# Patient Record
Sex: FEMALE | HISPANIC OR LATINO | Employment: UNEMPLOYED | URBAN - METROPOLITAN AREA
[De-identification: names, ages, dates, MRNs, and addresses within clinical notes are randomized per-mention and may not be internally consistent; named-entity substitution may affect disease eponyms.]

---

## 2017-04-13 ENCOUNTER — OFFICE VISIT (OUTPATIENT)
Dept: URGENT CARE | Facility: PHYSICIAN GROUP | Age: 57
End: 2017-04-13
Payer: COMMERCIAL

## 2017-04-13 VITALS
RESPIRATION RATE: 18 BRPM | OXYGEN SATURATION: 99 % | HEART RATE: 80 BPM | DIASTOLIC BLOOD PRESSURE: 82 MMHG | SYSTOLIC BLOOD PRESSURE: 140 MMHG | HEIGHT: 62 IN | TEMPERATURE: 98.6 F

## 2017-04-13 DIAGNOSIS — R04.0 EPISTAXIS: ICD-10-CM

## 2017-04-13 PROCEDURE — 30901 CONTROL OF NOSEBLEED: CPT | Performed by: FAMILY MEDICINE

## 2017-04-13 RX ORDER — LISINOPRIL 5 MG/1
5 TABLET ORAL DAILY
COMMUNITY

## 2017-04-13 NOTE — MR AVS SNAPSHOT
"Gena Rosas   2017 5:00 PM   Office Visit   MRN: [id number]    Department:  [facility]   Dept Phone:  [phone]    Description:  Female : 1960   Provider:  Henrik Stringer M.D.           Reason for Visit     Epistaxis onset 1 hour ago      Allergies as of 2017     Allergen Noted Reactions    Nkda [No Known Drug Allergy] 2008         Vital Signs     Blood Pressure Pulse Temperature Respirations Height Oxygen Saturation    140/82 mmHg 80 37 °C (98.6 °F) 18 1.575 m (5' 2.01\") 99%    Smoking Status                   Current Some Day Smoker           Basic Information     Date Of Birth Sex Race Ethnicity Preferred Language    1960 Female  or   Origin (North Korean,Belarusian,Fijian,Cymraes, etc) English      Health Maintenance        Date Due Completion Dates    IMM HEP B VACCINE (1 of 3 - Primary Series) 1960 ---    IMM DTaP/Tdap/Td Vaccine (1 - Tdap) 1981 ---    PAP SMEAR 1983 ---    MAMMOGRAM 2002 ---    COLONOSCOPY 2012 ---            Current Immunizations     No immunizations on file.      Below and/or attached are the medications your provider expects you to take. Review all of your home medications and newly ordered medications with your provider and/or pharmacist. Follow medication instructions as directed by your provider and/or pharmacist. Please keep your medication list with you and share with your provider. Update the information when medications are discontinued, doses are changed, or new medications (including over-the-counter products) are added; and carry medication information at all times in the event of emergency situations     Allergies:  NKDA - (reactions not documented)               Medications  Valid as of: 2017 -  7:04 PM    Generic Name Brand Name Tablet Size Instructions for use    Levothyroxine Sodium (Tab) SYNTHROID 100 MCG Take 100 mcg by mouth Every morning on an empty " stomach.        Lisinopril (Tab) PRINIVIL 5 MG Take 5 mg by mouth every day.        MetFORMIN HCl (Tab) GLUCOPHAGE 1000 MG Take 1,000 mg by mouth 2 times a day, with meals.        Terbinafine HCl (Tab) LAMISIL 250 MG Take 250 mg by mouth every day.        .                 Medicines prescribed today were sent to:     [facility] [address] [facility], [place] - [address]    [address] [place] [address]    Phone: [phone] Fax: [phone]    Open 24 Hours?: No    [facility] [address] [place], [place] - [address] [facility] [address] [facility]    [address] [address]    Phone: [phone] Fax: [phone]    Open 24 Hours?: No      Medication refill instructions:       If your prescription bottle indicates you have medication refills left, it is not necessary to call your provider’s office. Please contact your pharmacy and they will refill your medication.    If your prescription bottle indicates you do not have any refills left, you may request refills at any time through one of the following ways: The online Songbird system (except Urgent Care), by calling your provider’s office, or by asking your pharmacy to contact your provider’s office with a refill request. Medication refills are processed only during regular business hours and may not be available until the next business day. Your provider may request additional information or to have a follow-up visit with you prior to refilling your medication.   *Please Note: Medication refills are assigned a new Rx number when refilled electronically. Your pharmacy may indicate that no refills were authorized even though a new prescription for the same medication is available at the pharmacy. Please request the medicine by name with the pharmacy before contacting your provider for a refill.           Songbird Access Code: [email or web address]  Expires: 5/13/2017  7:02 PM    Songbird  A secure, online tool to manage your health  information     [employer]’s Fanwards® is a secure, online tool that connects you to your personalized health information from the privacy of your home -- day or night - making it very easy for you to manage your healthcare. Once the activation process is completed, you can even access your medical information using the Fanwards vickie, which is available for free in the Apple Vickie store or Google Play store.     Fanwards provides the following levels of access (as shown below):   My Chart Features   [facility] Doctor [facility]  Specialists [facility]  Urgent  Care Non-[facility]  Primary Care  Doctor   Email your healthcare team securely and privately 24/7 X X X    Manage appointments: schedule your next appointment; view details of past/upcoming appointments X      Request prescription refills. X      View recent personal medical records, including lab and immunizations X X X X   View health record, including health history, allergies, medications X X X X   Read reports about your outpatient visits, procedures, consult and ER notes X X X X   See your discharge summary, which is a recap of your hospital and/or ER visit that includes your diagnosis, lab results, and care plan. X X       How to register for Fanwards:  1. Go to  [email or web address].  2. Click on the Sign Up Now box, which takes you to the New Member Sign Up page. You will need to provide the following information:  a. Enter your Fanwards Access Code exactly as it appears at the top of this page. (You will not need to use this code after you’ve completed the sign-up process. If you do not sign up before the expiration date, you must request a new code.)   b. Enter your date of birth.   c. Enter your home email address.   d. Click Submit, and follow the next screen’s instructions.  3. Create a Fanwards ID. This will be your Fanwards login ID and cannot be changed, so think of one that is secure and easy to remember.  4. Create a Fanwards password. You can  change your password at any time.  5. Enter your Password Reset Question and Answer. This can be used at a later time if you forget your password.   6. Enter your e-mail address. This allows you to receive e-mail notifications when new information is available in [employer].  7. Click Sign Up. You can now view your health information.    For assistance activating your Lumicity account, call [phone]        Quit Tobacco Information     Do you want to quit using tobacco?    Quitting tobacco decreases risks of cancer, heart and lung disease, increases life expectancy, improves sense of taste and smell, and increases spending money, among other benefits.    If you are thinking about quitting, we can help.  • [facility] Quit Tobacco Program: 333.538.6340  o Program occurs weekly for four weeks and includes pharmacist consultation on products to support quitting smoking or chewing tobacco. A provider referral is needed for pharmacist consultation.  • Tobacco Users Help Hotline: 5-800-QUIT-NOW (838-4577) or https://[place].quitlogix.org/  o Free, confidential telephone and online coaching for [place] residents. Sessions are designed on a schedule that is convenient for you. Eligible clients receive free nicotine replacement therapy.  • Nationally: www.smokefree.gov  o Information and professional assistance to support both immediate and long-term needs as you become, and remain, a non-smoker. Smokefree.gov allows you to choose the help that best fits your needs.

## 2017-04-19 ASSESSMENT — ENCOUNTER SYMPTOMS
SORE THROAT: 0
BRUISES/BLEEDS EASILY: 0

## 2017-04-19 NOTE — PROGRESS NOTES
"Subjective:      Gena Rosas is a 56 y.o. female who presents with Epistaxis            HPI  Onset 1hr PTA right epistaxis. No trauma. No PMH hemophilia or blood thinning medications. No pain.     Review of Systems   HENT: Positive for congestion. Negative for sore throat.    Endo/Heme/Allergies: Does not bruise/bleed easily.     .  Medications, Allergies, and current problem list reviewed today in Epic       Objective:     /82 mmHg  Pulse 80  Temp(Src) 37 °C (98.6 °F)  Resp 18  Ht 1.575 m (5' 2.01\")  SpO2 99%     Physical Exam   Constitutional: She appears well-developed and well-nourished. No distress.   HENT:   Bleeding from right anterior septum. Large clot blown out. Neosynephrine applied and nose clamp for 15 min with hemostasis achieved.    Eyes: Conjunctivae are normal.   Neurological:   Speech is clear. Patient is appropriate and cooperative.     Skin: Skin is warm and dry. No rash noted.               Assessment/Plan:     1. Epistaxis       Resolved  Differential diagnosis, natural history, supportive care, and indications for immediate follow-up discussed at length.     "

## 2017-07-05 ENCOUNTER — HOSPITAL ENCOUNTER (OUTPATIENT)
Dept: RADIOLOGY | Facility: MEDICAL CENTER | Age: 57
End: 2017-07-05
Attending: FAMILY MEDICINE
Payer: COMMERCIAL

## 2017-07-05 DIAGNOSIS — M25.531 RIGHT WRIST PAIN: ICD-10-CM

## 2017-07-05 PROCEDURE — 73110 X-RAY EXAM OF WRIST: CPT | Mod: RT

## 2018-07-25 ENCOUNTER — OFFICE VISIT (OUTPATIENT)
Dept: URGENT CARE | Facility: PHYSICIAN GROUP | Age: 58
End: 2018-07-25
Payer: COMMERCIAL

## 2018-07-25 VITALS
WEIGHT: 221 LBS | BODY MASS INDEX: 40.67 KG/M2 | DIASTOLIC BLOOD PRESSURE: 88 MMHG | RESPIRATION RATE: 16 BRPM | HEIGHT: 62 IN | SYSTOLIC BLOOD PRESSURE: 132 MMHG | OXYGEN SATURATION: 96 % | HEART RATE: 81 BPM | TEMPERATURE: 97.9 F

## 2018-07-25 DIAGNOSIS — M25.512 ACUTE PAIN OF LEFT SHOULDER: Primary | ICD-10-CM

## 2018-07-25 DIAGNOSIS — R07.89 CHEST WALL PAIN: ICD-10-CM

## 2018-07-25 PROCEDURE — 99214 OFFICE O/P EST MOD 30 MIN: CPT | Performed by: NURSE PRACTITIONER

## 2018-07-25 RX ORDER — NAPROXEN 500 MG/1
500 TABLET ORAL 2 TIMES DAILY WITH MEALS
Qty: 40 TAB | Refills: 0 | Status: SHIPPED | OUTPATIENT
Start: 2018-07-25 | End: 2021-08-29

## 2018-07-25 RX ORDER — KETOROLAC TROMETHAMINE 30 MG/ML
60 INJECTION, SOLUTION INTRAMUSCULAR; INTRAVENOUS ONCE
Status: COMPLETED | OUTPATIENT
Start: 2018-07-25 | End: 2018-07-25

## 2018-07-25 RX ADMIN — KETOROLAC TROMETHAMINE 60 MG: 30 INJECTION, SOLUTION INTRAMUSCULAR; INTRAVENOUS at 19:05

## 2018-07-26 NOTE — PROGRESS NOTES
"Subjective:      Gena Rosas is a 58 y.o. female who presents with Chest Pain (mild left-sided chest pain and upper back pain x5 days)    PFS reviewed and updated as necessary in EPIC electronic record with patient today.  Medications including OTC medications reviewed with patient.         Allergies   Allergen Reactions   • Nkda [No Known Drug Allergy]              HPI 58-year-old female here complaining of left upper chest pain, left shoulder pain and left upper back pain for 5 days. Pain is intermittent lasting about an hour to 2 hours at a time. Pain is worse with pressure to her chest or be event of her arm. She has recently lost a very close nephew and has been crying a lot and not sleeping as well secondary to grieving. When the chest pain occurs she is sometimes at rest and other times being active. There are no provoking activities or alleviating activities that she can identify. She has taken 400 mg of ibuprofen yesterday that did not alleviate the pain. She denies diaphoresis, nausea, vomiting. She denies trauma or unusual activities. Pain 4-5 out of 10, achy.    ROS    See history of present illness   Objective:     /88   Pulse 81   Temp 36.6 °C (97.9 °F)   Resp 16   Ht 1.575 m (5' 2\")   Wt 100.2 kg (221 lb)   SpO2 96%   BMI 40.42 kg/m²      Physical Exam   Constitutional: She is oriented to person, place, and time. She appears well-developed and well-nourished.   HENT:   Head: Normocephalic.   Eyes: Pupils are equal, round, and reactive to light.   Neck: Normal range of motion. Neck supple.   Cardiovascular: Normal rate, regular rhythm and normal heart sounds.    No murmur heard.  Pulmonary/Chest: Effort normal and breath sounds normal. No respiratory distress.         She exhibits tenderness. She exhibits no mass, no laceration, no crepitus, no edema, no deformity, no swelling and no retraction.       Musculoskeletal:        Left shoulder: She exhibits decreased range of " motion (limited range of motion with internal and external rotation bilaterally. Movement reproduces pain.), tenderness and pain. She exhibits no bony tenderness, no swelling, no effusion, no crepitus, no deformity, no spasm, normal pulse and normal strength.        Arms:  Neurological: She is alert and oriented to person, place, and time.   Skin: Skin is warm and dry.   Psychiatric: She has a normal mood and affect. Her behavior is normal. Judgment and thought content normal.   Nursing note and vitals reviewed.         EKG Interpretation    Interpreted by me    Rhythm: normal sinus   Rate: normal  Axis: normal  Ectopy: none  Conduction: normal  ST Segments: no acute change  T Waves: no acute change  Q Waves: none    Clinical Impression: no acute changes         Assessment/Plan:     1. Acute pain of left shoulder  ketorolac (TORADOL) injection 60 mg    naproxen (NAPROSYN) 500 MG Tab    EKG   2. Chest wall pain  ketorolac (TORADOL) injection 60 mg    naproxen (NAPROSYN) 500 MG Tab    EKG     Educated in proper administration of medication(s) ordered today including safety, possible SE, risks, benefits, rationale and alternatives to therapy.   Ice  Heat prn   Start naprosyn tomorrow . Take with food.   OTC acetaminophen for breakthrough pain. Dosage and directions per . Do not exceed 3000 mg in 24 hours.   Return to clinic or PCP  5-7 days if current symptoms are not resolving in a satisfactory manner or sooner if new or worsening symptoms occur.   Patient and family member  advised differential diagnoses, signs and symptoms which would warrant further evaluation and /or emergent evaluation.     Patient was in agreement with this treatment plan and seemed to understand without barriers.   Questions were encouraged and answered to patients satisfaction.

## 2020-01-26 ENCOUNTER — OFFICE VISIT (OUTPATIENT)
Dept: URGENT CARE | Facility: PHYSICIAN GROUP | Age: 60
End: 2020-01-26
Payer: COMMERCIAL

## 2020-01-26 VITALS
HEART RATE: 95 BPM | SYSTOLIC BLOOD PRESSURE: 132 MMHG | WEIGHT: 236.4 LBS | OXYGEN SATURATION: 96 % | TEMPERATURE: 99.1 F | HEIGHT: 61 IN | RESPIRATION RATE: 20 BRPM | BODY MASS INDEX: 44.63 KG/M2 | DIASTOLIC BLOOD PRESSURE: 70 MMHG

## 2020-01-26 DIAGNOSIS — K52.9 GASTROENTERITIS: ICD-10-CM

## 2020-01-26 PROCEDURE — 99214 OFFICE O/P EST MOD 30 MIN: CPT | Performed by: NURSE PRACTITIONER

## 2020-01-26 RX ORDER — ONDANSETRON 4 MG/1
4 TABLET, ORALLY DISINTEGRATING ORAL ONCE
OUTPATIENT
Start: 2020-01-26 | End: 2020-01-27

## 2020-01-26 RX ORDER — ONDANSETRON 4 MG/1
4 TABLET, ORALLY DISINTEGRATING ORAL EVERY 6 HOURS PRN
Qty: 10 TAB | Refills: 0 | Status: SHIPPED | OUTPATIENT
Start: 2020-01-26 | End: 2021-08-29

## 2020-01-26 ASSESSMENT — PAIN SCALES - GENERAL: PAINLEVEL: 6=MODERATE PAIN

## 2020-01-27 ASSESSMENT — ENCOUNTER SYMPTOMS
NAUSEA: 1
FEVER: 0
DIARRHEA: 1
VOMITING: 1
NUMBER OF EPISODES OF EMESIS TODAY: 1
CHILLS: 0

## 2020-01-27 NOTE — PROGRESS NOTES
Subjective:      Gena Rosas is a 59 y.o. female who presents with Emesis (diarhhea, body aches since 2am this morning )    Past Medical History:   Diagnosis Date   • Diabetes (HCC)    • Thyroid disease      Social History     Socioeconomic History   • Marital status: Single     Spouse name: Not on file   • Number of children: Not on file   • Years of education: Not on file   • Highest education level: Not on file   Occupational History   • Not on file   Social Needs   • Financial resource strain: Not on file   • Food insecurity:     Worry: Not on file     Inability: Not on file   • Transportation needs:     Medical: Not on file     Non-medical: Not on file   Tobacco Use   • Smoking status: Former Smoker     Packs/day: 0.00     Last attempt to quit: 2013     Years since quittin.5   • Smokeless tobacco: Never Used   Substance and Sexual Activity   • Alcohol use: No   • Drug use: No   • Sexual activity: Not on file   Lifestyle   • Physical activity:     Days per week: Not on file     Minutes per session: Not on file   • Stress: Not on file   Relationships   • Social connections:     Talks on phone: Not on file     Gets together: Not on file     Attends Yazidism service: Not on file     Active member of club or organization: Not on file     Attends meetings of clubs or organizations: Not on file     Relationship status: Not on file   • Intimate partner violence:     Fear of current or ex partner: Not on file     Emotionally abused: Not on file     Physically abused: Not on file     Forced sexual activity: Not on file   Other Topics Concern   • Not on file   Social History Narrative   • Not on file     History reviewed. No pertinent family history.    Allergies: Nkda [no known drug allergy]    Patient is a 59-year-old female who presents today with complaint of nausea, vomiting, and diarrhea with intermittent upper abdominal/epigastric pain.  Symptoms started over the last 18 hours.  She  "estimates 10 episodes of diarrhea and 4 episodes of vomiting.  Patient states that prior to onset of symptoms she ate some chicken wings that she thought might have been bad.  She states her symptoms started shortly after ingesting this food.  Patient is primarily Syrian-speaking and is accompanied by family member today who is translating at the patient's request.          Emesis   This is a new problem. The current episode started today. The problem has been unchanged. Associated symptoms include nausea and vomiting. Pertinent negatives include no chills or fever. Associated symptoms comments: Diarrhea. Nothing aggravates the symptoms. She has tried nothing for the symptoms. The treatment provided no relief.       Review of Systems   Constitutional: Positive for malaise/fatigue. Negative for chills and fever.   Gastrointestinal: Positive for diarrhea, nausea and vomiting.   All other systems reviewed and are negative.         Objective:     /70 (BP Location: Left arm, Patient Position: Sitting, BP Cuff Size: Adult)   Pulse 95   Temp 37.3 °C (99.1 °F) (Temporal)   Resp 20   Ht 1.549 m (5' 1\")   Wt 107.2 kg (236 lb 6.4 oz)   SpO2 96%   BMI 44.67 kg/m²      Physical Exam  Vitals signs reviewed.   Constitutional:       Appearance: Normal appearance.   HENT:      Head: Normocephalic.      Right Ear: Tympanic membrane, ear canal and external ear normal.      Left Ear: Tympanic membrane, ear canal and external ear normal.      Nose: Nose normal.      Mouth/Throat:      Mouth: Mucous membranes are moist.   Eyes:      Extraocular Movements: Extraocular movements intact.      Pupils: Pupils are equal, round, and reactive to light.   Neck:      Musculoskeletal: Normal range of motion and neck supple.   Cardiovascular:      Rate and Rhythm: Normal rate and regular rhythm.      Heart sounds: Normal heart sounds.   Pulmonary:      Effort: Pulmonary effort is normal.      Breath sounds: Normal breath sounds. "   Abdominal:      General: Abdomen is flat. There is no distension.      Tenderness: There is no tenderness. There is no guarding or rebound.      Comments: There is no abdominal pain on palpation at this time.   Musculoskeletal: Normal range of motion.   Skin:     General: Skin is warm and dry.   Neurological:      Mental Status: She is alert.                 Assessment/Plan:       1. Gastroenteritis    - ondansetron (ZOFRAN ODT) 4 MG TABLET DISPERSIBLE; Take 1 Tab by mouth every 6 hours as needed for Nausea.  Dispense: 10 Tab; Refill: 0  - ondansetron (ZOFRAN ODT) dispertab 4 mg  -Imodium over-the-counter as needed  -Push electrolyte fluids  -Gallia diet  -Strict ER precautions for intractable vomiting or diarrhea, fever, or developing abdominal pain  -Follow-up in urgent care otherwise for any further questions or concerns

## 2021-08-29 ENCOUNTER — OFFICE VISIT (OUTPATIENT)
Dept: URGENT CARE | Facility: PHYSICIAN GROUP | Age: 61
End: 2021-08-29
Payer: COMMERCIAL

## 2021-08-29 VITALS
SYSTOLIC BLOOD PRESSURE: 160 MMHG | OXYGEN SATURATION: 98 % | BODY MASS INDEX: 43 KG/M2 | RESPIRATION RATE: 40 BRPM | WEIGHT: 219 LBS | DIASTOLIC BLOOD PRESSURE: 94 MMHG | HEIGHT: 60 IN | HEART RATE: 59 BPM | TEMPERATURE: 96.2 F

## 2021-08-29 DIAGNOSIS — R11.2 NAUSEA AND VOMITING, INTRACTABILITY OF VOMITING NOT SPECIFIED, UNSPECIFIED VOMITING TYPE: ICD-10-CM

## 2021-08-29 DIAGNOSIS — K29.00 ACUTE GASTRITIS, PRESENCE OF BLEEDING UNSPECIFIED, UNSPECIFIED GASTRITIS TYPE: ICD-10-CM

## 2021-08-29 DIAGNOSIS — R10.9 ABDOMINAL PAIN, UNSPECIFIED ABDOMINAL LOCATION: ICD-10-CM

## 2021-08-29 LAB
APPEARANCE UR: NORMAL
BILIRUB UR STRIP-MCNC: NORMAL MG/DL
COLOR UR AUTO: NORMAL
GLUCOSE UR STRIP.AUTO-MCNC: NEGATIVE MG/DL
KETONES UR STRIP.AUTO-MCNC: 15 MG/DL
LEUKOCYTE ESTERASE UR QL STRIP.AUTO: NORMAL
NITRITE UR QL STRIP.AUTO: NEGATIVE
PH UR STRIP.AUTO: 5.5 [PH] (ref 5–8)
PROT UR QL STRIP: 30 MG/DL
RBC UR QL AUTO: NEGATIVE
SP GR UR STRIP.AUTO: >=1.03
UROBILINOGEN UR STRIP-MCNC: 0.2 MG/DL

## 2021-08-29 PROCEDURE — 81002 URINALYSIS NONAUTO W/O SCOPE: CPT | Performed by: NURSE PRACTITIONER

## 2021-08-29 PROCEDURE — 99214 OFFICE O/P EST MOD 30 MIN: CPT | Performed by: NURSE PRACTITIONER

## 2021-08-29 RX ORDER — ONDANSETRON 4 MG/1
4 TABLET, ORALLY DISINTEGRATING ORAL EVERY 6 HOURS PRN
Qty: 10 TABLET | Refills: 0 | Status: SHIPPED | OUTPATIENT
Start: 2021-08-29

## 2021-08-29 RX ORDER — ONDANSETRON 2 MG/ML
4 INJECTION INTRAMUSCULAR; INTRAVENOUS ONCE
Status: COMPLETED | OUTPATIENT
Start: 2021-08-29 | End: 2021-08-29

## 2021-08-29 RX ORDER — OMEPRAZOLE 40 MG/1
40 CAPSULE, DELAYED RELEASE ORAL DAILY
Qty: 14 CAPSULE | Refills: 0 | Status: SHIPPED | OUTPATIENT
Start: 2021-08-29

## 2021-08-29 RX ORDER — SUCRALFATE 1 G/1
1 TABLET ORAL
Qty: 56 TABLET | Refills: 0 | Status: SHIPPED | OUTPATIENT
Start: 2021-08-29

## 2021-08-29 RX ORDER — ATORVASTATIN CALCIUM 20 MG/1
20 TABLET, FILM COATED ORAL NIGHTLY
COMMUNITY

## 2021-08-29 RX ADMIN — ONDANSETRON 4 MG: 2 INJECTION INTRAMUSCULAR; INTRAVENOUS at 10:45

## 2021-08-29 RX ADMIN — ONDANSETRON 4 MG: 2 INJECTION INTRAMUSCULAR; INTRAVENOUS at 10:18

## 2021-08-29 ASSESSMENT — ENCOUNTER SYMPTOMS
ABDOMINAL PAIN: 1
CARDIOVASCULAR NEGATIVE: 1
NAUSEA: 1
RESPIRATORY NEGATIVE: 1
CHILLS: 1
DIARRHEA: 0
VOMITING: 1

## 2021-08-29 ASSESSMENT — VISUAL ACUITY: OU: 1

## 2021-08-29 NOTE — LETTER
August 29, 2021         Patient: Gena Rosas   YOB: 1960   Date of Visit: 8/29/2021           To Whom it May Concern:    Gena Joseph was seen in my clinic on 8/29/2021 due to illness. Due to medical necessity, please excuse patient from work for up to the next 3 days as needed.     If you have any questions or concerns, please don't hesitate to call.        Sincerely,         DYLLAN Carranza.  Electronically Signed

## 2021-08-29 NOTE — PATIENT INSTRUCTIONS
Gastritis - Adultos  Gastritis, Adult  La gastritis es la inflamación del estómago. Hay dos tipos de gastritis:  · Gastritis aguda. Mami tipo aparece de manera repentina.  · Gastritis crónica. Mami tipo es mucho más frecuente y dura mucho tiempo.  La gastritis se manifiesta cuando el revestimiento del estómago se debilita o se lesiona. Sin tratamiento, la gastritis puede causar sangrado y úlceras estomacales.  ¿Cuáles son las causas?  Esta afección puede ser causada por lo siguiente:  · Infección.  · Beber alcohol en exceso.  · Ciertos medicamentos. Estos incluyen corticoesteroides, antibióticos y algunos medicamentos de venta sin receta, laurie aspirina o ibuprofeno.  · Hay demasiado ácido en el estómago.  · Stephie enfermedad del intestino o del estómago.  · Estrés.  · Stephie reacción alérgica.  · Enfermedad de Crohn.  · Algunos tratamientos para el cáncer (radiación).  A veces, se desconoce la causa de esta afección.  ¿Cuáles son los signos o los síntomas?  Los síntomas de esta afección incluyen los siguientes:  · Dolor o sensación de ardor en la parte superior del abdomen.  · Náuseas.  · Vómitos.  · Sensación molesta de saciedad después de comer.  · Pérdida de peso.  · Mal aliento.  · Marya en el vómito o las heces.  En algunos casos, no hay síntomas.  ¿Cómo se diagnostica?  Esta afección puede diagnosticarse en función de lo siguiente:  · Marya antecedentes médicos y stephie descripción de marya síntomas.  · Un examen físico.  · Estudios. Estos pueden incluir los siguientes:  ? Análisis de marya.  ? Pruebas de heces.  ? Un estudio en el que se introduce un instrumento brizuela y flexible con stephie tree y stephie pequeña cámara a través del esófago hasta el estómago (endoscopía nilson).  ? Un estudio en el cual se joanna stephie muestra de tejido para analizarlo (biopsia).  ¿Cómo se trata?  Esta afección se puede tratar con medicamentos. Los medicamentos que se utilizan varían según la causa de la gastritis:  · Si la afección se debe a stephie  infección bacteriana, pueden recetarle medicamentos antibióticos.  · Si la afección se debe al exceso de ácido estomacal, se pueden administrar medicamentos denominados bloqueadores H2, inhibidores de la bomba de protones o antiácidos.  El tratamiento puede también incluir interrumpir el uso de ciertos medicamentos laurie aspirina, ibuprofeno u otros antiinflamatorios no esteroideos (FRANTZ).  Siga estas indicaciones en melo casa:  Medicamentos  · [place] los medicamentos de venta namita y los recetados solamente laurie se lo haya indicado el médico.  · Si le recetaron un antibiótico, tómelo laurie se lo haya indicado el médico. No deje de alix el antibiótico, aunque comience a sentirse mejor.  Comida y bebida    · [place]ody comidas pequeñas y frecuentes sarmad el día en lugar de comidas abundantes.  · Evite los alimentos y las bebidas que intensifican los síntomas.  · [place] suficiente líquido laurie para mantener la orina de color amarillo pálido.  Consumo de alcohol  · No lambert alcohol si:  ? Melo médico le indica no hacerlo.  ? Está embarazada, puede estar embarazada o está tratando de quedar embarazada.  · Si robe alcohol:  ? Limite melo uso a las siguientes medidas:  § De 0 a 1 medida por día para las mujeres.  § De 0 a 2 medidas por día para los hombres.  ? Esté atento a la cantidad de alcohol que hay en las bebidas que joanna. En los [place], herber medida equivale a herber botella de cerveza de 12 oz (355 ml), un vaso de vino de 5 oz (148 ml) o un vaso de herber bebida alcohólica de nilson graduación de 1½ oz (44 ml).  Indicaciones generales  · Hable con el médico sobre las formas de controlar el estrés, laurie realizar ejercicio con regularidad o practicar respiración profunda, meditación o yoga.  · No consuma ningún producto que contenga nicotina o tabaco, laurie cigarrillos y cigarrillos electrónicos. Si necesita ayuda para dejar de fumar, consulte al médico.  · Concurra a todas las visitas de seguimiento laurie se lo haya indicado el  médico. [place] es importante.  Comuníquese con un médico si:  · Teagan síntomas empeoran.  · Los síntomas regresan después del tratamiento.  Solicite ayuda inmediatamente si:  · Vomita marya de color cesar brillante o herber sustancia similar a los granos de café.  · Las heces son negras o de color cesar oscuro.  · No puede retener los líquidos.  · El dolor abdominal empeora.  · Tiene fiebre.  · No se siente mejor luego de herber semana.  Resumen  · La gastritis es la inflamación del revestimiento del estómago que puede ocurrir de manera repentina (aguda) o desarrollarse lentamente con el tiempo (crónica).  · Esta afección se diagnostica mediante los antecedentes médicos, un examen físico o estudios.  · Esta afección puede tratarse con medicamentos para tratar la infección o medicamentos para reducir la cantidad de ácido en el estómago.  · Siga las indicaciones del médico acerca de alix medicamentos, hacer cambios en la dieta y saber cuándo pedir ayuda.  Esta información no tiene laurie fin reemplazar el consejo del médico. Asegúrese de hacerle al médico cualquier pregunta que tenga.  Document Released: 09/27/2006 Document Revised: 06/26/2019 Document Reviewed: 06/26/2019  Elsevier Patient Education © 2020 Elsevier Inc.        Náuseas y vómitos en los adultos  Nausea and Vomiting, Adult  Las náuseas son herber sensación de malestar en el estómago o de que se está por vomitar. Los vómitos se producen cuando el contenido del estómago se expulsa por la boca laurie consecuencia de las náuseas. Pueden hacerlo sentir débil y causar deshidratación.  La deshidratación puede hacerle sentir cansancio, sed, sequedad en la boca y disminución en la frecuencia con la que orina. Los adultos mayores y las personas que tienen otras enfermedades o un sistema de defensa (sistema inmunitario) débil tienen mayor riesgo de sufrir deshidratación. Es importante tratar los vómitos laurie se lo haya indicado el médico.  Siga estas indicaciones en melo  casa:  Controle teagan síntomas para detectar cualquier cambio. Informe al médico acerca de los cambios. Siga estas indicaciones para cuidarse en melo hogar.  Comida y bebida         · [place] herber solución de rehidratación oral (SRO). Esta es herber bebida que se vende en farmacias y tiendas minoristas.  · En la medida en que pueda, lambert líquidos ruma lentamente y en pequeñas cantidades. Los líquidos ruma incluyen agua, cubitos de hielo, bebidas deportivas bajas en calorías y jugo de fruta rebajado con agua (jugo de fruta diluido).  · En la medida en que pueda, consuma alimentos blandos y fáciles de digerir en pequeñas cantidades. Estos alimentos incluyen bananas, compota de manzana, arroz, roseanna magras, tostadas y galletas saladas.  · Evite consumir líquidos que contengan mucha azúcar o cafeína, laurie bebidas energéticas, bebidas deportivas y refrescos.  · Evite alix alcohol.  · Evite los alimentos condimentados o con alto contenido de grasa.  Indicaciones generales  · [place] los medicamentos de venta namita y los recetados solamente laurie se lo haya indicado el médico.  · [place] suficiente líquido laurie para mantener la orina de color amarillo pálido.  · Lávese las anna marie frecuentemente usando agua y jabón. Use desinfectante para anna marie si no dispone de agua y jabón.  · Asegúrese de que todas las personas que viven en melo casa se laven molina las anna marie y con frecuencia.  · Descanse en melo casa mientras se recupera.  · Controle melo afección para detectar cualquier cambio.  · Cuando sienta náuseas, respire lenta y profundamente.  · Concurra a todas las visitas de control laurie se lo haya indicado el médico. [place] es importante.  Comuníquese con un médico si:  · Teagan síntomas empeoran.  · Aparecen nuevos síntomas.  · Tiene fiebre.  · No puede beber líquidos sin vomitar.  · Las náuseas no desaparecen después de 2 días.  · Se siente aturdido o mareado.  · Tiene dolor de darcy.  · Tiene calambres musculares.  · Tiene herber erupción  cutánea.  · Siente dolor al orinar.  Solicite ayuda inmediatamente si:  · Siente dolor en el pecho, el ambar, los brazos o la mandíbula.  · Se siente muy débil o se desmaya.  · Tiene vómitos persistentes.  · Vomita y el vómito es de color cesar intenso o se asemeja al poso del café.  · Tiene heces con marya, de color darin, o con aspecto alquitranado.  · Siente dolor de darcy intenso, rigidez en el ambar, o ambas cosas.  · Tiene dolor intenso, cólicos o distensión abdominal.  · Tiene dificultades para respirar, o respira muy rápido.  · Melo corazón late muy rápidamente.  · Siente la piel fría y húmeda.  · Se siente confundido.  · Tiene signos de deshidratación, laurie los siguientes:  ? Orina de color oscuro, muy escasa o falta de orina.  ? Labios agrietados.  ? Sequedad de boca.  ? Ojos hundidos.  ? Somnolencia.  ? Debilidad.  Estos síntomas pueden representar un problema grave que constituye herber emergencia. No espere a cara si los síntomas desaparecen. Solicite atención médica de inmediato. Comuníquese con el servicio de emergencias de melo localidad (911 en los [place]). No conduzca por marya propios medios hasta el hospital.  Resumen  · Las náuseas son herber sensación de malestar en el estómago o de que se está por vomitar. Si las náuseas empeoran, pueden provocar vómitos. Pueden hacerlo sentir débil y causar deshidratación.  · Siga las indicaciones del médico respecto de las comidas y las bebidas para prevenir la deshidratación.  · [place] los medicamentos de venta namita y los recetados solamente laurie se lo haya indicado el médico.  · Comuníquese con melo médico si los síntomas empeoran o si tiene nuevos síntomas.  · Concurra a todas las visitas de control laurie se lo haya indicado el médico. [place] es importante.  Esta información no tiene laurie fin reemplazar el consejo del médico. Asegúrese de hacerle al médico cualquier pregunta que tenga.  Document Released: 01/06/2009 Document Revised: 08/05/2019 Document Reviewed:  08/05/2019  Elsevier Patient Education © 2020 Elsevier Inc.

## 2021-08-29 NOTE — PROGRESS NOTES
Subjective:     Gena Rosas is a 61 y.o. female who presents for Abdominal Pain (Pt reports mid abd pain. Onset 3 hours. Pt reports severe pain, nausea, emesis x5. Pt sts she has a hx of gastroenteritis. Pt sts she had shrimp luis at a frineds house last night. pt st sshe ahs been feeling like this for the last 3 weeks but it comes and goes until this morning. )       Abdominal Pain  This is a new problem. The problem has been gradually worsening. Associated symptoms include nausea and vomiting. Pertinent negatives include no diarrhea, dysuria or frequency.     Patient brought in by family member who is helping to translate in [place].    Patient reporting epigastric abdominal pain, nausea, vomiting, and chills which started yesterday after eating shrimp Luis had a friend's house.  History of gastroenteritis in the past.  Denies urinary symptoms, shortness of breath, chest pain, diarrhea, or other symptoms.    Patient was screened prior to rooming and denied COVID-19 diagnosis or contact with a person who has been diagnosed or is suspected to have COVID-19. During this visit, appropriate PPE was worn, hand hygiene was performed, and the patient and any visitors were masked.     PMH:  has a past medical history of Diabetes (HCC) and Thyroid disease. She also has no past medical history of ASTHMA, CAD (coronary artery disease), Cancer (HCC), Congestive heart failure (HCC), COPD, Hypertension, Infectious disease, Liver disease, Psychiatric disorder, Renal disorder, Seizure disorder (HCC), or Stroke (HCC).    MEDS:   Current Outpatient Medications:   •  atorvastatin (LIPITOR) 20 MG Tab, Take 20 mg by mouth every evening., Disp: , Rfl:   •  ondansetron (ZOFRAN ODT) 4 MG TABLET DISPERSIBLE, Take 1 Tablet by mouth every 6 hours as needed for Nausea. Dissolve in mouth., Disp: 10 Tablet, Rfl: 0  •  omeprazole (PRILOSEC) 40 MG delayed-release capsule, Take 1 Capsule by mouth every day., Disp: 14  Capsule, Rfl: 0  •  sucralfate (CARAFATE) 1 GM Tab, Take 1 Tablet by mouth 4 Times a Day,Before Meals and at Bedtime., Disp: 56 Tablet, Rfl: 0  •  lisinopril (PRINIVIL) 5 MG Tab, Take 5 mg by mouth every day., Disp: , Rfl:   •  metformin (GLUCOPHAGE) 1000 MG tablet, Take 1,000 mg by mouth 2 times a day, with meals., Disp: , Rfl:   •  levothyroxine (SYNTHROID) 100 MCG TABS, Take 100 mcg by mouth Every morning on an empty stomach., Disp: , Rfl:   •  ondansetron (ZOFRAN ODT) 4 MG TABLET DISPERSIBLE, Take 1 Tab by mouth every 6 hours as needed for Nausea. (Patient not taking: Reported on 8/29/2021), Disp: 10 Tab, Rfl: 0  •  naproxen (NAPROSYN) 500 MG Tab, Take 1 Tab by mouth 2 times a day, with meals. (Patient not taking: Reported on 8/29/2021), Disp: 40 Tab, Rfl: 0    ALLERGIES:   Allergies   Allergen Reactions   • Nkda [No Known Drug Allergy]      SURGHX: History reviewed. No pertinent surgical history.    SOCHX:  reports that she quit smoking about 8 years ago. She smoked 0.00 packs per day. She has never used smokeless tobacco. She reports that she does not drink alcohol and does not use drugs.     FH: Reviewed with patient, not pertinent to this visit.    Review of Systems   Constitutional: Positive for chills.   Respiratory: Negative.    Cardiovascular: Negative.    Gastrointestinal: Positive for abdominal pain, nausea and vomiting. Negative for diarrhea.   Genitourinary: Negative.  Negative for dysuria, frequency and urgency.   All other systems reviewed and are negative.    Additional details per HPI.      Objective:     /94 (BP Location: Left arm, Patient Position: Sitting, BP Cuff Size: Large adult)   Pulse (!) 59   Temp (!) 35.7 °C (96.2 °F) (Temporal)   Resp (!) 40   Ht 1.524 m (5')   Wt 99.3 kg (219 lb)   SpO2 98%   BMI 42.77 kg/m²     Physical Exam  Vitals reviewed.   Constitutional:       General: She is not in acute distress.     Appearance: She is well-developed. She is ill-appearing  (Vomiting, hand placed on epigastric region). She is not toxic-appearing.   HENT:      Head: Normocephalic.      Right Ear: External ear normal.      Left Ear: External ear normal.   Eyes:      General: Vision grossly intact.      Extraocular Movements: Extraocular movements intact.      Conjunctiva/sclera: Conjunctivae normal.   Cardiovascular:      Rate and Rhythm: Normal rate.   Pulmonary:      Effort: Pulmonary effort is normal. No respiratory distress.   Abdominal:      General: Bowel sounds are increased. There is no distension.      Palpations: Abdomen is soft.      Tenderness: There is no abdominal tenderness.   Musculoskeletal:         General: No deformity. Normal range of motion.      Cervical back: Normal range of motion.   Skin:     General: Skin is warm and dry.      Coloration: Skin is not pale.   Neurological:      Mental Status: She is alert and oriented to person, place, and time.      Sensory: No sensory deficit.      Motor: No weakness.      Coordination: Coordination normal.   Psychiatric:         Behavior: Behavior normal. Behavior is cooperative.     UA: trace LE, protein, ketone; small bili, otherwise unremarkable      Assessment/Plan:     1. Abdominal pain, unspecified abdominal location  - POCT Urinalysis  - omeprazole (PRILOSEC) 40 MG delayed-release capsule; Take 1 Capsule by mouth every day.  Dispense: 14 Capsule; Refill: 0  - sucralfate (CARAFATE) 1 GM Tab; Take 1 Tablet by mouth 4 Times a Day,Before Meals and at Bedtime.  Dispense: 56 Tablet; Refill: 0    2. Acute gastritis, presence of bleeding unspecified, unspecified gastritis type  - omeprazole (PRILOSEC) 40 MG delayed-release capsule; Take 1 Capsule by mouth every day.  Dispense: 14 Capsule; Refill: 0  - sucralfate (CARAFATE) 1 GM Tab; Take 1 Tablet by mouth 4 Times a Day,Before Meals and at Bedtime.  Dispense: 56 Tablet; Refill: 0    3. Nausea and vomiting, intractability of vomiting not specified, unspecified vomiting type  -  ondansetron (ZOFRAN) syringe/vial injection 4 mg  - ondansetron (ZOFRAN ODT) 4 MG TABLET DISPERSIBLE; Take 1 Tablet by mouth every 6 hours as needed for Nausea. Dissolve in mouth.  Dispense: 10 Tablet; Refill: 0  - ondansetron (ZOFRAN) syringe/vial injection 4 mg    Zofran injection given in clinic. Patient reporting improvement in nausea/vomiting. Had another episode of vomiting. 2nd shot given.    GI cocktail given. Patient reporting improvement in epigastric abdominal pain.    Discussed likely self-limiting viral etiology and expected course and duration of illness. Vital signs stable, afebrile, no acute distress at this time.    Rx as above sent electronically.     Vital signs stable, afebrile, no acute distress at this time. Warning signs reviewed. Strict return/ER precautions advised.     Differential diagnosis, natural history, supportive care, over-the-counter symptom management per 's instructions, close monitoring, and indications for immediate follow-up discussed.     All questions answered. Patient agrees with the plan of care.    Discharge summary provided.    Billing note: 30 minutes was allotted and spent for patient care and coordination of care (not reported separately) including preparing for the visit, obtaining/reviewing history with the assistance of family member, performing an exam/evaluation, ordering labs/tests/procedures, developing a plan of care, counseling/educating the patient, and documentation. Care specific to this encounter was summarized here. Please refer to the chart for additional details on the care provided.

## 2022-12-21 ENCOUNTER — OFFICE VISIT (OUTPATIENT)
Dept: URGENT CARE | Facility: PHYSICIAN GROUP | Age: 62
End: 2022-12-21
Payer: COMMERCIAL

## 2022-12-21 ENCOUNTER — HOSPITAL ENCOUNTER (OUTPATIENT)
Facility: MEDICAL CENTER | Age: 62
End: 2022-12-21
Attending: FAMILY MEDICINE
Payer: COMMERCIAL

## 2022-12-21 VITALS
HEIGHT: 60 IN | SYSTOLIC BLOOD PRESSURE: 128 MMHG | HEART RATE: 76 BPM | TEMPERATURE: 99.9 F | RESPIRATION RATE: 18 BRPM | OXYGEN SATURATION: 97 % | WEIGHT: 218 LBS | BODY MASS INDEX: 42.8 KG/M2 | DIASTOLIC BLOOD PRESSURE: 82 MMHG

## 2022-12-21 DIAGNOSIS — Z03.818 ENCOUNTER FOR PATIENT CONCERN ABOUT EXPOSURE TO INFECTIOUS ORGANISM: ICD-10-CM

## 2022-12-21 LAB
EXTERNAL QUALITY CONTROL: NORMAL
FLUAV+FLUBV AG SPEC QL IA: NEGATIVE
INT CON NEG: NEGATIVE
INT CON NEG: NEGATIVE
INT CON POS: POSITIVE
INT CON POS: POSITIVE
SARS-COV+SARS-COV-2 AG RESP QL IA.RAPID: NEGATIVE

## 2022-12-21 PROCEDURE — 99213 OFFICE O/P EST LOW 20 MIN: CPT | Performed by: FAMILY MEDICINE

## 2022-12-21 PROCEDURE — 87426 SARSCOV CORONAVIRUS AG IA: CPT | Performed by: FAMILY MEDICINE

## 2022-12-21 PROCEDURE — U0003 INFECTIOUS AGENT DETECTION BY NUCLEIC ACID (DNA OR RNA); SEVERE ACUTE RESPIRATORY SYNDROME CORONAVIRUS 2 (SARS-COV-2) (CORONAVIRUS DISEASE [COVID-19]), AMPLIFIED PROBE TECHNIQUE, MAKING USE OF HIGH THROUGHPUT TECHNOLOGIES AS DESCRIBED BY CMS-2020-01-R: HCPCS

## 2022-12-21 PROCEDURE — 87804 INFLUENZA ASSAY W/OPTIC: CPT | Performed by: FAMILY MEDICINE

## 2022-12-21 PROCEDURE — U0005 INFEC AGEN DETEC AMPLI PROBE: HCPCS

## 2022-12-21 NOTE — LETTER
December 21, 2022    To Whom It May Concern:         This is confirmation that Gena Rosas attended her scheduled appointment with Melissa Sandhu M.D. on 12/21/22.  COVID testing is in progress, please allow up to 72 hours for results.  If negative, patient can return to work when she has had no fevers for 24 hours without the help of medication.         If you have any questions please do not hesitate to call me at the phone number listed below.    Sincerely,          Melissa Sandhu M.D.  [phone]

## 2022-12-22 DIAGNOSIS — Z03.818 ENCOUNTER FOR PATIENT CONCERN ABOUT EXPOSURE TO INFECTIOUS ORGANISM: ICD-10-CM

## 2022-12-22 LAB
SARS-COV-2 RNA RESP QL NAA+PROBE: NOTDETECTED
SPECIMEN SOURCE: NORMAL

## 2022-12-22 NOTE — PROGRESS NOTES
Subjective:      62 y.o. female presents to urgent care for cold symptoms that started yesterday. She is experiencing sore throat, body aches, headache, ear pain, cough. No fever or diarrhea. She hasn't been using any medication for her symptoms. She denies any tobacco product use.  No history of asthma or COPD.  She is not vaccinated against COVID.  No known sick contacts.    She denies any other questions or concerns at this time.    Current problem list, medication, and past medical/surgical history were reviewed in Epic.    ROS  See HPI     Objective:      /82 (BP Location: Right arm, Patient Position: Sitting, BP Cuff Size: Adult)   Pulse 76   Temp 37.7 °C (99.9 °F) (Temporal)   Resp 18   Ht 1.524 m (5')   Wt 98.9 kg (218 lb)   SpO2 97%   BMI 42.58 kg/m²     Physical Exam  Constitutional:       General: She is not in acute distress.     Appearance: She is not diaphoretic.   HENT:      Right Ear: Tympanic membrane, ear canal and external ear normal.      Left Ear: Tympanic membrane, ear canal and external ear normal.      Mouth/Throat:      Tongue: Tongue does not deviate from midline.      Palate: No lesions.      Pharynx: Uvula midline. No posterior oropharyngeal erythema.      Tonsils: No tonsillar exudate. 1+ on the right. 1+ on the left.   Cardiovascular:      Rate and Rhythm: Normal rate and regular rhythm.      Heart sounds: Normal heart sounds.   Pulmonary:      Effort: Pulmonary effort is normal. No respiratory distress.      Breath sounds: Normal breath sounds.   Neurological:      Mental Status: She is alert.   Psychiatric:         Mood and Affect: Affect normal.         Judgment: Judgment normal.     Assessment/Plan:     1. Encounter for patient concern about exposure to infectious organism  Negative rapid flu and COVID.  PCR COVID has been sent. In the meantime patient was advised to isolate until COVID test results returned. I encouraged mask use, frequent handwashing, wiping down  hard surfaces, etc. Tylenol and Ibuprofen were recommended for symptomatic relief. If positive they will be contacted by their local health department regarding return to work/school protocols. Patient is currently without indication of need for higher level of care. Patient/Guardian was given precautionary signs/symptoms that mandate immediate follow up and evaluation in the ED. The patient and/or guardian demonstrated a good understanding and agreed with the treatment plan.  - POCT Influenza A/B  - POCT SARS-COV Antigen AYDEE Manual Result  - SARS-CoV-2 PCR (24 hour In-House): Collect NP swab in VTM; Future      Instructed to return to Urgent Care or nearest Emergency Department if symptoms fail to improve, for any change in condition, further concerns, or new concerning symptoms. Patient states understanding of the plan of care and discharge instructions.    Melissa Sandhu M.D.

## 2024-09-03 PROCEDURE — 36415 COLL VENOUS BLD VENIPUNCTURE: CPT

## 2024-09-03 PROCEDURE — 99285 EMERGENCY DEPT VISIT HI MDM: CPT

## 2024-09-03 PROCEDURE — 93005 ELECTROCARDIOGRAM TRACING: CPT

## 2024-09-03 PROCEDURE — 96375 TX/PRO/DX INJ NEW DRUG ADDON: CPT

## 2024-09-03 PROCEDURE — 93005 ELECTROCARDIOGRAM TRACING: CPT | Performed by: STUDENT IN AN ORGANIZED HEALTH CARE EDUCATION/TRAINING PROGRAM

## 2024-09-03 PROCEDURE — 96374 THER/PROPH/DIAG INJ IV PUSH: CPT

## 2024-09-04 ENCOUNTER — APPOINTMENT (OUTPATIENT)
Dept: RADIOLOGY | Facility: MEDICAL CENTER | Age: 64
End: 2024-09-04
Attending: STUDENT IN AN ORGANIZED HEALTH CARE EDUCATION/TRAINING PROGRAM
Payer: COMMERCIAL

## 2024-09-04 ENCOUNTER — HOSPITAL ENCOUNTER (EMERGENCY)
Facility: MEDICAL CENTER | Age: 64
End: 2024-09-04
Attending: STUDENT IN AN ORGANIZED HEALTH CARE EDUCATION/TRAINING PROGRAM
Payer: COMMERCIAL

## 2024-09-04 VITALS
SYSTOLIC BLOOD PRESSURE: 143 MMHG | HEIGHT: 65 IN | RESPIRATION RATE: 17 BRPM | WEIGHT: 200 LBS | DIASTOLIC BLOOD PRESSURE: 64 MMHG | HEART RATE: 75 BPM | OXYGEN SATURATION: 95 % | TEMPERATURE: 97.4 F | BODY MASS INDEX: 33.32 KG/M2

## 2024-09-04 DIAGNOSIS — R79.89 ELEVATED LFTS: ICD-10-CM

## 2024-09-04 DIAGNOSIS — K29.00 ACUTE GASTRITIS WITHOUT HEMORRHAGE, UNSPECIFIED GASTRITIS TYPE: ICD-10-CM

## 2024-09-04 LAB
ALBUMIN SERPL BCP-MCNC: 3.8 G/DL (ref 3.2–4.9)
ALBUMIN/GLOB SERPL: 1.1 G/DL
ALP SERPL-CCNC: 83 U/L (ref 30–99)
ALT SERPL-CCNC: 107 U/L (ref 2–50)
ANION GAP SERPL CALC-SCNC: 14 MMOL/L (ref 7–16)
AST SERPL-CCNC: 218 U/L (ref 12–45)
BASOPHILS # BLD AUTO: 0.3 % (ref 0–1.8)
BASOPHILS # BLD: 0.03 K/UL (ref 0–0.12)
BILIRUB SERPL-MCNC: 0.9 MG/DL (ref 0.1–1.5)
BUN SERPL-MCNC: 15 MG/DL (ref 8–22)
CALCIUM ALBUM COR SERPL-MCNC: 9.4 MG/DL (ref 8.5–10.5)
CALCIUM SERPL-MCNC: 9.2 MG/DL (ref 8.5–10.5)
CHLORIDE SERPL-SCNC: 102 MMOL/L (ref 96–112)
CO2 SERPL-SCNC: 21 MMOL/L (ref 20–33)
CREAT SERPL-MCNC: 0.62 MG/DL (ref 0.5–1.4)
EKG IMPRESSION: NORMAL
EOSINOPHIL # BLD AUTO: 0.01 K/UL (ref 0–0.51)
EOSINOPHIL NFR BLD: 0.1 % (ref 0–6.9)
ERYTHROCYTE [DISTWIDTH] IN BLOOD BY AUTOMATED COUNT: 44.6 FL (ref 35.9–50)
GFR SERPLBLD CREATININE-BSD FMLA CKD-EPI: 100 ML/MIN/1.73 M 2
GLOBULIN SER CALC-MCNC: 3.5 G/DL (ref 1.9–3.5)
GLUCOSE SERPL-MCNC: 172 MG/DL (ref 65–99)
HAV IGM SERPL QL IA: NORMAL
HBV CORE IGM SER QL: NORMAL
HBV SURFACE AG SER QL: NORMAL
HCT VFR BLD AUTO: 40.1 % (ref 37–47)
HCV AB SER QL: NORMAL
HGB BLD-MCNC: 13.6 G/DL (ref 12–16)
IMM GRANULOCYTES # BLD AUTO: 0.03 K/UL (ref 0–0.11)
IMM GRANULOCYTES NFR BLD AUTO: 0.3 % (ref 0–0.9)
LIPASE SERPL-CCNC: 27 U/L (ref 11–82)
LYMPHOCYTES # BLD AUTO: 1.31 K/UL (ref 1–4.8)
LYMPHOCYTES NFR BLD: 14.3 % (ref 22–41)
MCH RBC QN AUTO: 31.1 PG (ref 27–33)
MCHC RBC AUTO-ENTMCNC: 33.9 G/DL (ref 32.2–35.5)
MCV RBC AUTO: 91.6 FL (ref 81.4–97.8)
MONOCYTES # BLD AUTO: 0.42 K/UL (ref 0–0.85)
MONOCYTES NFR BLD AUTO: 4.6 % (ref 0–13.4)
NEUTROPHILS # BLD AUTO: 7.35 K/UL (ref 1.82–7.42)
NEUTROPHILS NFR BLD: 80.4 % (ref 44–72)
NRBC # BLD AUTO: 0 K/UL
NRBC BLD-RTO: 0 /100 WBC (ref 0–0.2)
PLATELET # BLD AUTO: 254 K/UL (ref 164–446)
PMV BLD AUTO: 10.2 FL (ref 9–12.9)
POTASSIUM SERPL-SCNC: 4.1 MMOL/L (ref 3.6–5.5)
PROT SERPL-MCNC: 7.3 G/DL (ref 6–8.2)
RBC # BLD AUTO: 4.38 M/UL (ref 4.2–5.4)
SODIUM SERPL-SCNC: 137 MMOL/L (ref 135–145)
TROPONIN T SERPL-MCNC: <6 NG/L (ref 6–19)
WBC # BLD AUTO: 9.2 K/UL (ref 4.8–10.8)

## 2024-09-04 PROCEDURE — 71045 X-RAY EXAM CHEST 1 VIEW: CPT

## 2024-09-04 PROCEDURE — 36415 COLL VENOUS BLD VENIPUNCTURE: CPT

## 2024-09-04 PROCEDURE — 83690 ASSAY OF LIPASE: CPT

## 2024-09-04 PROCEDURE — 700111 HCHG RX REV CODE 636 W/ 250 OVERRIDE (IP): Mod: JZ | Performed by: STUDENT IN AN ORGANIZED HEALTH CARE EDUCATION/TRAINING PROGRAM

## 2024-09-04 PROCEDURE — 80074 ACUTE HEPATITIS PANEL: CPT

## 2024-09-04 PROCEDURE — 96375 TX/PRO/DX INJ NEW DRUG ADDON: CPT

## 2024-09-04 PROCEDURE — 85025 COMPLETE CBC W/AUTO DIFF WBC: CPT

## 2024-09-04 PROCEDURE — 84484 ASSAY OF TROPONIN QUANT: CPT

## 2024-09-04 PROCEDURE — 80053 COMPREHEN METABOLIC PANEL: CPT

## 2024-09-04 PROCEDURE — 96374 THER/PROPH/DIAG INJ IV PUSH: CPT

## 2024-09-04 RX ORDER — ONDANSETRON 2 MG/ML
4 INJECTION INTRAMUSCULAR; INTRAVENOUS ONCE
Status: COMPLETED | OUTPATIENT
Start: 2024-09-04 | End: 2024-09-04

## 2024-09-04 RX ORDER — PANTOPRAZOLE SODIUM 40 MG/10ML
40 INJECTION, POWDER, LYOPHILIZED, FOR SOLUTION INTRAVENOUS ONCE
Status: COMPLETED | OUTPATIENT
Start: 2024-09-04 | End: 2024-09-04

## 2024-09-04 RX ADMIN — PANTOPRAZOLE SODIUM 40 MG: 40 INJECTION, POWDER, FOR SOLUTION INTRAVENOUS at 01:13

## 2024-09-04 RX ADMIN — FAMOTIDINE 20 MG: 10 INJECTION, SOLUTION INTRAVENOUS at 01:13

## 2024-09-04 RX ADMIN — ONDANSETRON 4 MG: 2 INJECTION INTRAMUSCULAR; INTRAVENOUS at 01:13

## 2024-09-04 NOTE — ED NOTES
Pt discharged home. GCS 15. IV discontinued and gauze placed, pt in possession of belongings. Pt provided discharge education and information pertaining to follow up appointments. Pt received copy of discharge instructions and verbalized understanding.     Vitals:    09/04/24 0324   BP: (!) 143/64   Pulse: 75   Resp: 17   Temp: 36.3 °C (97.4 °F)   SpO2: 95%

## 2024-09-04 NOTE — ED TRIAGE NOTES
Chief Complaint   Patient presents with    Abdominal Pain     Pt reports hx of gastritis, pt in mid epigastrium to back.  Pt states this started intermittently this morning, pain is described as burning        Pt to triage via w/c for above complaint. Presents with mid epigastric pain, pt has hx of gastritis, states this feels similar to her flare ups. Pt normally takes prilosec and carafate    Pt back to lobby, educated on triage process and encourage to alert staff of any changes.     Vitals:    09/03/24 2346   BP: (!) 160/59   Pulse:    Resp:    Temp:    SpO2:

## 2024-09-04 NOTE — ED PROVIDER NOTES
ED Provider Note    CHIEF COMPLAINT  Chief Complaint   Patient presents with    Abdominal Pain     Pt reports hx of gastritis, pt in mid epigastrium to back.  Pt states this started intermittently this morning, pain is described as burning        EXTERNAL RECORDS REVIEWED  Outpatient Notes office visit on 2021 for abdominal pain, patient diagnosed with gastritis and prescribed omeprazole and sucralfate    HPI/ROS  LIMITATION TO HISTORY   Select: : None  OUTSIDE HISTORIAN(S):    Gena Rosas is a 64 y.o. female with epigastric burning pain that radiates to the back.  Patient reports that this is consistent with her past medical history of gastritis.  Patient was on pantoprazole for several months but had it discontinued in May because she was feeling better.  Patient denies black or bloody stool.  Patient denies black or bloody emesis.  Patient has had vomiting.  Patient denies dysuria or hematuria.  Patient denies unilateral weakness or numbness.    PAST MEDICAL HISTORY   has a past medical history of Diabetes (HCC) and Thyroid disease.    SURGICAL HISTORY  patient denies any surgical history    FAMILY HISTORY  No family history on file.    SOCIAL HISTORY  Social History     Tobacco Use    Smoking status: Former     Current packs/day: 0.00     Types: Cigarettes     Quit date: 2013     Years since quittin.1    Smokeless tobacco: Never   Vaping Use    Vaping status: Never Used   Substance and Sexual Activity    Alcohol use: No    Drug use: No    Sexual activity: Not on file       CURRENT MEDICATIONS  Home Medications       Reviewed by Patrica Muñiz R.N. (Registered Nurse) on 24 at 2344  Med List Status: Not Addressed     Medication Last Dose Status   atorvastatin (LIPITOR) 20 MG Tab  Active   levothyroxine (SYNTHROID) 100 MCG TABS  Active   lisinopril (PRINIVIL) 5 MG Tab  Active   metformin (GLUCOPHAGE) 1000 MG tablet  Active   omeprazole (PRILOSEC) 40 MG delayed-release capsule  " Active   ondansetron (ZOFRAN ODT) 4 MG TABLET DISPERSIBLE  Active   sucralfate (CARAFATE) 1 GM Tab  Active                  Audit from Redirected Encounters    **Home medications have not yet been reviewed for this encounter**         ALLERGIES  Allergies   Allergen Reactions    Nkda [No Known Drug Allergy]        PHYSICAL EXAM  VITAL SIGNS: BP (!) 143/70   Pulse 63   Temp 36.2 °C (97.2 °F) (Temporal)   Resp 20   Ht 1.651 m (5' 5\")   Wt 90.7 kg (200 lb)   SpO2 96%   BMI 33.28 kg/m²    Vitals and nursing note reviewed.   Constitutional:       Comments: Patient is lying in bed supine, pleasant, conversant, speaking in complete sentences   HENT:      Head: Normocephalic and atraumatic.   Eyes:      Extraocular Movements: Extraocular movements intact.      Conjunctiva/sclera: Conjunctivae normal.      Pupils: Pupils are equal, round, and reactive to light.   Cardiovascular:      Pulses: Normal pulses.      Comments: HR 77  Pulmonary:      Effort: Pulmonary effort is normal. No respiratory distress.   Abdominal:      Comments: Abdomen is soft, mild epigastric tenderness to palpation, non-distended, non-rigid, no rebound, guarding, masses, no McBurney's point tenderness, no peritoneal signs, negative Rovsing sign, negative Jean Baptiste sign.    Musculoskeletal:         General: No swelling. Normal range of motion.      Cervical back: Normal range of motion. No rigidity.   Skin:     General: Skin is warm and dry.      Capillary Refill: Capillary refill takes less than 2 seconds.   Neurological:      Mental Status: Alert.  Moving all extremities.      EKG/LABS  No ST elevations or arrhythmia  I have independently interpreted this EKG    RADIOLOGY/PROCEDURES   I have independently interpreted the diagnostic imaging associated with this visit and am waiting the final reading from the radiologist.   My preliminary interpretation is as follows: No pneumonia, pneumothorax, pulmonary edema, pleural effusion    Radiologist " interpretation:  DX-CHEST-PORTABLE (1 VIEW)   Final Result         1.  No acute cardiopulmonary disease.   2.  Cardiomegaly   3.  Atherosclerosis          COURSE & MEDICAL DECISION MAKING    ASSESSMENT, COURSE AND PLAN  Care Narrative: CBC to evaluate for acute anemia and leukocytosis.  CMP to evaluate for acute electrolyte abnormality, acute kidney injury, acute liver failure or dysfunction.  Chest x-ray without acute cardiopulmonary process, patient has underlying cardiomegaly.  Troponins rule out ACS, EKG nonischemic.  Lipase evaluate for pancreatitis.  Patient given famotidine, Zofran, Protonix for symptom control.  Disposition pending symptom improvement.    Electronically signed by: Jalen Lou M.D., 9/4/2024 1:21 AM    CMP demonstrates no evidence of acute kidney injury, acute electrolyte abnormality, CBC demonstrates no evidence of acute anemia or leukocytosis.  Patient has elevation LFTs, counseled to follow-up with PCP for repeat testing.  Lipase negative, pancreatitis inconsistent with patient presentation at this time.  Troponin negative, ACS inconsistent with patient presentation at this time.  Patient feeling better at this time, counseled on diet modification and counseled on taking pantoprazole until her symptoms improved.    Repeat physical exam benign.  I doubt any serious emergency process at this time.  Patient and/or family, friends given strict return precautions for worsening symptoms and care instructions. They have demonstrated understanding of discharge instructions through teach back mechanism. Advised PCP follow-up in 1-2 days.  Patient/family/friend expresses understanding and agrees to plan.    This dictation has been created using voice recognition software. I am continuously working with the software to minimize the number of voice recognition errors and I have made every attempt to manually correct the errors within my dictation. However errors  related to this voice  recognition software may still exist and should be interpreted within the appropriate context.     Electronically signed by: Jalen Lou M.D., 9/4/2024 3:11 AM        Heart score - 2      DISPOSITION AND DISCUSSIONS  Escalation of care considered, and ultimately not performed:acute inpatient care management, however at this time, the patient is most appropriate for outpatient management    Decision tools and prescription drugs considered including, but not limited to: Pain Medications   over-the-counter pain medications are appropriate, narcotics not indicated at this time  .    FINAL DIAGNOSIS  1. Acute gastritis without hemorrhage, unspecified gastritis type    2. Elevated LFTs         Electronically signed by: Jalen Lou M.D., 9/4/2024 1:17 AM

## 2024-09-04 NOTE — DISCHARGE INSTRUCTIONS
Please discuss the following findings with your regular doctor:  DX-CHEST-PORTABLE (1 VIEW)   Final Result         1.  No acute cardiopulmonary disease.   2.  Cardiomegaly   3.  Atherosclerosis        Labs Reviewed   CBC WITH DIFFERENTIAL - Abnormal; Notable for the following components:       Result Value    Neutrophils-Polys 80.40 (*)     Lymphocytes 14.30 (*)     All other components within normal limits   COMP METABOLIC PANEL - Abnormal; Notable for the following components:    Glucose 172 (*)     AST(SGOT) 218 (*)     ALT(SGPT) 107 (*)     All other components within normal limits   LIPASE   TROPONIN   ESTIMATED GFR

## 2024-09-05 ENCOUNTER — HOSPITAL ENCOUNTER (INPATIENT)
Facility: MEDICAL CENTER | Age: 64
LOS: 4 days | DRG: 417 | End: 2024-09-10
Attending: EMERGENCY MEDICINE | Admitting: INTERNAL MEDICINE
Payer: COMMERCIAL

## 2024-09-05 ENCOUNTER — APPOINTMENT (OUTPATIENT)
Dept: RADIOLOGY | Facility: MEDICAL CENTER | Age: 64
DRG: 417 | End: 2024-09-05
Attending: EMERGENCY MEDICINE
Payer: COMMERCIAL

## 2024-09-05 DIAGNOSIS — K85.10 GALLSTONE PANCREATITIS: ICD-10-CM

## 2024-09-05 DIAGNOSIS — K81.0 ACUTE CHOLECYSTITIS: ICD-10-CM

## 2024-09-05 LAB
ALBUMIN SERPL BCP-MCNC: 4.1 G/DL (ref 3.2–4.9)
ALBUMIN/GLOB SERPL: 1 G/DL
ALP SERPL-CCNC: 154 U/L (ref 30–99)
ALT SERPL-CCNC: 372 U/L (ref 2–50)
ANION GAP SERPL CALC-SCNC: 14 MMOL/L (ref 7–16)
AST SERPL-CCNC: 447 U/L (ref 12–45)
BASOPHILS # BLD AUTO: 0.5 % (ref 0–1.8)
BASOPHILS # BLD: 0.05 K/UL (ref 0–0.12)
BILIRUB SERPL-MCNC: 1.4 MG/DL (ref 0.1–1.5)
BUN SERPL-MCNC: 12 MG/DL (ref 8–22)
CALCIUM ALBUM COR SERPL-MCNC: 10.1 MG/DL (ref 8.5–10.5)
CALCIUM SERPL-MCNC: 10.2 MG/DL (ref 8.5–10.5)
CHLORIDE SERPL-SCNC: 98 MMOL/L (ref 96–112)
CO2 SERPL-SCNC: 23 MMOL/L (ref 20–33)
CREAT SERPL-MCNC: 0.65 MG/DL (ref 0.5–1.4)
EKG IMPRESSION: NORMAL
EOSINOPHIL # BLD AUTO: 0.06 K/UL (ref 0–0.51)
EOSINOPHIL NFR BLD: 0.6 % (ref 0–6.9)
ERYTHROCYTE [DISTWIDTH] IN BLOOD BY AUTOMATED COUNT: 44.7 FL (ref 35.9–50)
GFR SERPLBLD CREATININE-BSD FMLA CKD-EPI: 99 ML/MIN/1.73 M 2
GLOBULIN SER CALC-MCNC: 4.1 G/DL (ref 1.9–3.5)
GLUCOSE SERPL-MCNC: 144 MG/DL (ref 65–99)
HCT VFR BLD AUTO: 40.8 % (ref 37–47)
HGB BLD-MCNC: 13.7 G/DL (ref 12–16)
IMM GRANULOCYTES # BLD AUTO: 0.04 K/UL (ref 0–0.11)
IMM GRANULOCYTES NFR BLD AUTO: 0.4 % (ref 0–0.9)
LIPASE SERPL-CCNC: >3000 U/L (ref 11–82)
LYMPHOCYTES # BLD AUTO: 2.14 K/UL (ref 1–4.8)
LYMPHOCYTES NFR BLD: 19.7 % (ref 22–41)
MCH RBC QN AUTO: 30.6 PG (ref 27–33)
MCHC RBC AUTO-ENTMCNC: 33.6 G/DL (ref 32.2–35.5)
MCV RBC AUTO: 91.3 FL (ref 81.4–97.8)
MONOCYTES # BLD AUTO: 0.86 K/UL (ref 0–0.85)
MONOCYTES NFR BLD AUTO: 7.9 % (ref 0–13.4)
NEUTROPHILS # BLD AUTO: 7.69 K/UL (ref 1.82–7.42)
NEUTROPHILS NFR BLD: 70.9 % (ref 44–72)
NRBC # BLD AUTO: 0 K/UL
NRBC BLD-RTO: 0 /100 WBC (ref 0–0.2)
PLATELET # BLD AUTO: 258 K/UL (ref 164–446)
PMV BLD AUTO: 10 FL (ref 9–12.9)
POTASSIUM SERPL-SCNC: 3.4 MMOL/L (ref 3.6–5.5)
PROT SERPL-MCNC: 8.2 G/DL (ref 6–8.2)
RBC # BLD AUTO: 4.47 M/UL (ref 4.2–5.4)
SODIUM SERPL-SCNC: 135 MMOL/L (ref 135–145)
WBC # BLD AUTO: 10.8 K/UL (ref 4.8–10.8)

## 2024-09-05 PROCEDURE — 700111 HCHG RX REV CODE 636 W/ 250 OVERRIDE (IP): Mod: JZ | Performed by: EMERGENCY MEDICINE

## 2024-09-05 PROCEDURE — 93005 ELECTROCARDIOGRAM TRACING: CPT

## 2024-09-05 PROCEDURE — 80053 COMPREHEN METABOLIC PANEL: CPT

## 2024-09-05 PROCEDURE — 93005 ELECTROCARDIOGRAM TRACING: CPT | Performed by: EMERGENCY MEDICINE

## 2024-09-05 PROCEDURE — 700105 HCHG RX REV CODE 258: Performed by: EMERGENCY MEDICINE

## 2024-09-05 PROCEDURE — 85025 COMPLETE CBC W/AUTO DIFF WBC: CPT

## 2024-09-05 PROCEDURE — 96374 THER/PROPH/DIAG INJ IV PUSH: CPT

## 2024-09-05 PROCEDURE — 99285 EMERGENCY DEPT VISIT HI MDM: CPT

## 2024-09-05 PROCEDURE — 36415 COLL VENOUS BLD VENIPUNCTURE: CPT

## 2024-09-05 PROCEDURE — 76705 ECHO EXAM OF ABDOMEN: CPT

## 2024-09-05 PROCEDURE — 96375 TX/PRO/DX INJ NEW DRUG ADDON: CPT

## 2024-09-05 PROCEDURE — 83690 ASSAY OF LIPASE: CPT

## 2024-09-05 RX ORDER — MORPHINE SULFATE 4 MG/ML
4 INJECTION INTRAVENOUS ONCE
Status: COMPLETED | OUTPATIENT
Start: 2024-09-05 | End: 2024-09-05

## 2024-09-05 RX ORDER — SODIUM CHLORIDE 9 MG/ML
1000 INJECTION, SOLUTION INTRAVENOUS ONCE
Status: COMPLETED | OUTPATIENT
Start: 2024-09-05 | End: 2024-09-06

## 2024-09-05 RX ORDER — ONDANSETRON 2 MG/ML
4 INJECTION INTRAMUSCULAR; INTRAVENOUS ONCE
Status: COMPLETED | OUTPATIENT
Start: 2024-09-05 | End: 2024-09-05

## 2024-09-05 RX ADMIN — ONDANSETRON 4 MG: 2 INJECTION INTRAMUSCULAR; INTRAVENOUS at 23:23

## 2024-09-05 RX ADMIN — SODIUM CHLORIDE 1000 ML: 9 INJECTION, SOLUTION INTRAVENOUS at 23:22

## 2024-09-05 RX ADMIN — MORPHINE SULFATE 4 MG: 4 INJECTION, SOLUTION INTRAMUSCULAR; INTRAVENOUS at 23:23

## 2024-09-05 RX ADMIN — FAMOTIDINE 20 MG: 10 INJECTION, SOLUTION INTRAVENOUS at 23:23

## 2024-09-05 ASSESSMENT — FIBROSIS 4 INDEX: FIB4 SCORE: 5.14

## 2024-09-06 ENCOUNTER — APPOINTMENT (OUTPATIENT)
Dept: RADIOLOGY | Facility: MEDICAL CENTER | Age: 64
DRG: 417 | End: 2024-09-06
Attending: HOSPITALIST
Payer: COMMERCIAL

## 2024-09-06 PROBLEM — E78.5 DYSLIPIDEMIA: Status: ACTIVE | Noted: 2024-09-06

## 2024-09-06 PROBLEM — I10 HYPERTENSION: Status: ACTIVE | Noted: 2024-09-06

## 2024-09-06 PROBLEM — K81.9 CHOLECYSTITIS: Status: ACTIVE | Noted: 2024-09-06

## 2024-09-06 PROBLEM — E11.9 TYPE 2 DIABETES MELLITUS (HCC): Status: ACTIVE | Noted: 2024-09-06

## 2024-09-06 PROBLEM — E87.6 HYPOKALEMIA: Status: ACTIVE | Noted: 2024-09-06

## 2024-09-06 PROBLEM — K85.10 GALLSTONE PANCREATITIS: Status: ACTIVE | Noted: 2024-09-06

## 2024-09-06 LAB
ALBUMIN SERPL BCP-MCNC: 3.6 G/DL (ref 3.2–4.9)
ALBUMIN/GLOB SERPL: 1.2 G/DL
ALP SERPL-CCNC: 132 U/L (ref 30–99)
ALT SERPL-CCNC: 339 U/L (ref 2–50)
ANION GAP SERPL CALC-SCNC: 10 MMOL/L (ref 7–16)
APPEARANCE UR: CLEAR
AST SERPL-CCNC: 338 U/L (ref 12–45)
BASOPHILS # BLD AUTO: 0.4 % (ref 0–1.8)
BASOPHILS # BLD: 0.03 K/UL (ref 0–0.12)
BILIRUB SERPL-MCNC: 0.6 MG/DL (ref 0.1–1.5)
BILIRUB UR QL STRIP.AUTO: NEGATIVE
BUN SERPL-MCNC: 11 MG/DL (ref 8–22)
CALCIUM ALBUM COR SERPL-MCNC: 8.8 MG/DL (ref 8.5–10.5)
CALCIUM SERPL-MCNC: 8.5 MG/DL (ref 8.5–10.5)
CHLORIDE SERPL-SCNC: 108 MMOL/L (ref 96–112)
CO2 SERPL-SCNC: 23 MMOL/L (ref 20–33)
COLOR UR: YELLOW
CREAT SERPL-MCNC: 0.67 MG/DL (ref 0.5–1.4)
EOSINOPHIL # BLD AUTO: 0.04 K/UL (ref 0–0.51)
EOSINOPHIL NFR BLD: 0.5 % (ref 0–6.9)
ERYTHROCYTE [DISTWIDTH] IN BLOOD BY AUTOMATED COUNT: 45.5 FL (ref 35.9–50)
GFR SERPLBLD CREATININE-BSD FMLA CKD-EPI: 99 ML/MIN/1.73 M 2
GLOBULIN SER CALC-MCNC: 2.9 G/DL (ref 1.9–3.5)
GLUCOSE BLD STRIP.AUTO-MCNC: 169 MG/DL (ref 65–99)
GLUCOSE BLD STRIP.AUTO-MCNC: 95 MG/DL (ref 65–99)
GLUCOSE BLD STRIP.AUTO-MCNC: 97 MG/DL (ref 65–99)
GLUCOSE SERPL-MCNC: 127 MG/DL (ref 65–99)
GLUCOSE UR STRIP.AUTO-MCNC: NEGATIVE MG/DL
HCT VFR BLD AUTO: 34.6 % (ref 37–47)
HGB BLD-MCNC: 11.7 G/DL (ref 12–16)
IMM GRANULOCYTES # BLD AUTO: 0.02 K/UL (ref 0–0.11)
IMM GRANULOCYTES NFR BLD AUTO: 0.3 % (ref 0–0.9)
KETONES UR STRIP.AUTO-MCNC: NEGATIVE MG/DL
LEUKOCYTE ESTERASE UR QL STRIP.AUTO: NEGATIVE
LIPASE SERPL-CCNC: 1914 U/L (ref 11–82)
LYMPHOCYTES # BLD AUTO: 2.41 K/UL (ref 1–4.8)
LYMPHOCYTES NFR BLD: 30.7 % (ref 22–41)
MCH RBC QN AUTO: 31 PG (ref 27–33)
MCHC RBC AUTO-ENTMCNC: 33.8 G/DL (ref 32.2–35.5)
MCV RBC AUTO: 91.8 FL (ref 81.4–97.8)
MICRO URNS: NORMAL
MONOCYTES # BLD AUTO: 0.57 K/UL (ref 0–0.85)
MONOCYTES NFR BLD AUTO: 7.3 % (ref 0–13.4)
NEUTROPHILS # BLD AUTO: 4.78 K/UL (ref 1.82–7.42)
NEUTROPHILS NFR BLD: 60.8 % (ref 44–72)
NITRITE UR QL STRIP.AUTO: NEGATIVE
NRBC # BLD AUTO: 0 K/UL
NRBC BLD-RTO: 0 /100 WBC (ref 0–0.2)
PH UR STRIP.AUTO: 6.5 [PH] (ref 5–8)
PLATELET # BLD AUTO: 231 K/UL (ref 164–446)
PMV BLD AUTO: 10.4 FL (ref 9–12.9)
POTASSIUM SERPL-SCNC: 3.9 MMOL/L (ref 3.6–5.5)
PROT SERPL-MCNC: 6.5 G/DL (ref 6–8.2)
PROT UR QL STRIP: NEGATIVE MG/DL
RBC # BLD AUTO: 3.77 M/UL (ref 4.2–5.4)
RBC UR QL AUTO: NEGATIVE
SODIUM SERPL-SCNC: 141 MMOL/L (ref 135–145)
SP GR UR STRIP.AUTO: 1.01
UROBILINOGEN UR STRIP.AUTO-MCNC: 1 MG/DL
WBC # BLD AUTO: 7.9 K/UL (ref 4.8–10.8)

## 2024-09-06 PROCEDURE — 82962 GLUCOSE BLOOD TEST: CPT | Mod: 91

## 2024-09-06 PROCEDURE — 700101 HCHG RX REV CODE 250: Performed by: INTERNAL MEDICINE

## 2024-09-06 PROCEDURE — 770006 HCHG ROOM/CARE - MED/SURG/GYN SEMI*

## 2024-09-06 PROCEDURE — 700111 HCHG RX REV CODE 636 W/ 250 OVERRIDE (IP): Mod: JZ | Performed by: INTERNAL MEDICINE

## 2024-09-06 PROCEDURE — A9270 NON-COVERED ITEM OR SERVICE: HCPCS | Performed by: INTERNAL MEDICINE

## 2024-09-06 PROCEDURE — 85025 COMPLETE CBC W/AUTO DIFF WBC: CPT

## 2024-09-06 PROCEDURE — 700102 HCHG RX REV CODE 250 W/ 637 OVERRIDE(OP): Performed by: INTERNAL MEDICINE

## 2024-09-06 PROCEDURE — 83690 ASSAY OF LIPASE: CPT

## 2024-09-06 PROCEDURE — 99254 IP/OBS CNSLTJ NEW/EST MOD 60: CPT | Performed by: STUDENT IN AN ORGANIZED HEALTH CARE EDUCATION/TRAINING PROGRAM

## 2024-09-06 PROCEDURE — 81003 URINALYSIS AUTO W/O SCOPE: CPT

## 2024-09-06 PROCEDURE — 96372 THER/PROPH/DIAG INJ SC/IM: CPT

## 2024-09-06 PROCEDURE — 99223 1ST HOSP IP/OBS HIGH 75: CPT | Performed by: INTERNAL MEDICINE

## 2024-09-06 PROCEDURE — 700101 HCHG RX REV CODE 250: Performed by: HOSPITALIST

## 2024-09-06 PROCEDURE — 36415 COLL VENOUS BLD VENIPUNCTURE: CPT

## 2024-09-06 PROCEDURE — 80053 COMPREHEN METABOLIC PANEL: CPT

## 2024-09-06 PROCEDURE — 700111 HCHG RX REV CODE 636 W/ 250 OVERRIDE (IP): Performed by: HOSPITALIST

## 2024-09-06 PROCEDURE — 96376 TX/PRO/DX INJ SAME DRUG ADON: CPT

## 2024-09-06 RX ORDER — ACETAMINOPHEN 500 MG
500-1000 TABLET ORAL EVERY 6 HOURS PRN
COMMUNITY

## 2024-09-06 RX ORDER — OXYCODONE HYDROCHLORIDE 10 MG/1
10 TABLET ORAL
Status: DISCONTINUED | OUTPATIENT
Start: 2024-09-06 | End: 2024-09-10 | Stop reason: HOSPADM

## 2024-09-06 RX ORDER — PROMETHAZINE HYDROCHLORIDE 25 MG/1
12.5-25 SUPPOSITORY RECTAL EVERY 4 HOURS PRN
Status: DISCONTINUED | OUTPATIENT
Start: 2024-09-06 | End: 2024-09-10 | Stop reason: HOSPADM

## 2024-09-06 RX ORDER — PROMETHAZINE HYDROCHLORIDE 25 MG/1
12.5-25 TABLET ORAL EVERY 4 HOURS PRN
Status: DISCONTINUED | OUTPATIENT
Start: 2024-09-06 | End: 2024-09-10 | Stop reason: HOSPADM

## 2024-09-06 RX ORDER — SUCRALFATE 1 G/1
1 TABLET ORAL
Status: DISCONTINUED | OUTPATIENT
Start: 2024-09-06 | End: 2024-09-06

## 2024-09-06 RX ORDER — DEXTROSE MONOHYDRATE 25 G/50ML
25 INJECTION, SOLUTION INTRAVENOUS
Status: DISCONTINUED | OUTPATIENT
Start: 2024-09-06 | End: 2024-09-10 | Stop reason: HOSPADM

## 2024-09-06 RX ORDER — ONDANSETRON 4 MG/1
4 TABLET, ORALLY DISINTEGRATING ORAL EVERY 4 HOURS PRN
Status: DISCONTINUED | OUTPATIENT
Start: 2024-09-06 | End: 2024-09-10 | Stop reason: HOSPADM

## 2024-09-06 RX ORDER — MORPHINE SULFATE 4 MG/ML
4 INJECTION INTRAVENOUS
Status: DISCONTINUED | OUTPATIENT
Start: 2024-09-06 | End: 2024-09-10 | Stop reason: HOSPADM

## 2024-09-06 RX ORDER — ENOXAPARIN SODIUM 100 MG/ML
40 INJECTION SUBCUTANEOUS DAILY
Status: DISCONTINUED | OUTPATIENT
Start: 2024-09-06 | End: 2024-09-10 | Stop reason: HOSPADM

## 2024-09-06 RX ORDER — PROCHLORPERAZINE EDISYLATE 5 MG/ML
5-10 INJECTION INTRAMUSCULAR; INTRAVENOUS EVERY 4 HOURS PRN
Status: DISCONTINUED | OUTPATIENT
Start: 2024-09-06 | End: 2024-09-10 | Stop reason: HOSPADM

## 2024-09-06 RX ORDER — PANTOPRAZOLE SODIUM 40 MG/1
40 TABLET, DELAYED RELEASE ORAL DAILY
COMMUNITY

## 2024-09-06 RX ORDER — ONDANSETRON 2 MG/ML
4 INJECTION INTRAMUSCULAR; INTRAVENOUS EVERY 4 HOURS PRN
Status: DISCONTINUED | OUTPATIENT
Start: 2024-09-06 | End: 2024-09-10 | Stop reason: HOSPADM

## 2024-09-06 RX ORDER — SODIUM CHLORIDE AND POTASSIUM CHLORIDE 150; 900 MG/100ML; MG/100ML
INJECTION, SOLUTION INTRAVENOUS CONTINUOUS
Status: DISCONTINUED | OUTPATIENT
Start: 2024-09-06 | End: 2024-09-08

## 2024-09-06 RX ORDER — LEVOTHYROXINE SODIUM 100 UG/1
100 TABLET ORAL
Status: DISCONTINUED | OUTPATIENT
Start: 2024-09-06 | End: 2024-09-10 | Stop reason: HOSPADM

## 2024-09-06 RX ORDER — LORAZEPAM 2 MG/ML
0.5 INJECTION INTRAMUSCULAR
Status: DISCONTINUED | OUTPATIENT
Start: 2024-09-06 | End: 2024-09-07

## 2024-09-06 RX ORDER — POLYETHYLENE GLYCOL 3350 17 G/17G
1 POWDER, FOR SOLUTION ORAL
Status: DISCONTINUED | OUTPATIENT
Start: 2024-09-06 | End: 2024-09-10 | Stop reason: HOSPADM

## 2024-09-06 RX ORDER — AMOXICILLIN 250 MG
2 CAPSULE ORAL EVERY EVENING
Status: DISCONTINUED | OUTPATIENT
Start: 2024-09-06 | End: 2024-09-10 | Stop reason: HOSPADM

## 2024-09-06 RX ORDER — OXYCODONE HYDROCHLORIDE 5 MG/1
5 TABLET ORAL
Status: DISCONTINUED | OUTPATIENT
Start: 2024-09-06 | End: 2024-09-10 | Stop reason: HOSPADM

## 2024-09-06 RX ORDER — LABETALOL HYDROCHLORIDE 5 MG/ML
10 INJECTION, SOLUTION INTRAVENOUS EVERY 4 HOURS PRN
Status: DISCONTINUED | OUTPATIENT
Start: 2024-09-06 | End: 2024-09-10 | Stop reason: HOSPADM

## 2024-09-06 RX ADMIN — POTASSIUM CHLORIDE AND SODIUM CHLORIDE: 900; 150 INJECTION, SOLUTION INTRAVENOUS at 17:16

## 2024-09-06 RX ADMIN — POTASSIUM CHLORIDE AND SODIUM CHLORIDE: 900; 150 INJECTION, SOLUTION INTRAVENOUS at 03:32

## 2024-09-06 RX ADMIN — FAMOTIDINE 20 MG: 10 INJECTION, SOLUTION INTRAVENOUS at 05:48

## 2024-09-06 RX ADMIN — LORAZEPAM 0.5 MG: 2 INJECTION INTRAMUSCULAR; INTRAVENOUS at 17:44

## 2024-09-06 RX ADMIN — LEVOTHYROXINE SODIUM 100 MCG: 0.1 TABLET ORAL at 05:48

## 2024-09-06 RX ADMIN — SENNOSIDES AND DOCUSATE SODIUM 2 TABLET: 50; 8.6 TABLET ORAL at 17:15

## 2024-09-06 RX ADMIN — INSULIN HUMAN 1 UNITS: 100 INJECTION, SOLUTION PARENTERAL at 05:57

## 2024-09-06 SDOH — ECONOMIC STABILITY: TRANSPORTATION INSECURITY
IN THE PAST 12 MONTHS, HAS LACK OF RELIABLE TRANSPORTATION KEPT YOU FROM MEDICAL APPOINTMENTS, MEETINGS, WORK OR FROM GETTING THINGS NEEDED FOR DAILY LIVING?: NO

## 2024-09-06 SDOH — ECONOMIC STABILITY: TRANSPORTATION INSECURITY
IN THE PAST 12 MONTHS, HAS THE LACK OF TRANSPORTATION KEPT YOU FROM MEDICAL APPOINTMENTS OR FROM GETTING MEDICATIONS?: NO

## 2024-09-06 ASSESSMENT — ENCOUNTER SYMPTOMS
BACK PAIN: 0
DIARRHEA: 0
BLURRED VISION: 0
BLOOD IN STOOL: 0
VOMITING: 1
CLAUDICATION: 0
PALPITATIONS: 0
HALLUCINATIONS: 0
HEADACHES: 0
CONSTIPATION: 0
MYALGIAS: 0
WEAKNESS: 0
BACK PAIN: 1
WHEEZING: 0
EYE DISCHARGE: 0
SPEECH CHANGE: 0
COUGH: 0
ABDOMINAL PAIN: 1
DIAPHORESIS: 0
DIZZINESS: 0
NAUSEA: 1
DEPRESSION: 0
BRUISES/BLEEDS EASILY: 0
FOCAL WEAKNESS: 0
ORTHOPNEA: 0
SENSORY CHANGE: 0
CHILLS: 0
DOUBLE VISION: 0
NECK PAIN: 0
WEIGHT LOSS: 0
FLANK PAIN: 0
NERVOUS/ANXIOUS: 0
SORE THROAT: 0
HEMOPTYSIS: 0
TREMORS: 0
EYE PAIN: 0
LOSS OF CONSCIOUSNESS: 0
FEVER: 0
PHOTOPHOBIA: 0
SHORTNESS OF BREATH: 0
HEARTBURN: 0
SPUTUM PRODUCTION: 0
POLYDIPSIA: 0

## 2024-09-06 ASSESSMENT — LIFESTYLE VARIABLES
HOW MANY TIMES IN THE PAST YEAR HAVE YOU HAD 5 OR MORE DRINKS IN A DAY: 0
TOTAL SCORE: 0
TOTAL SCORE: 0
HAVE PEOPLE ANNOYED YOU BY CRITICIZING YOUR DRINKING: NO
SUBSTANCE_ABUSE: 0
HAVE YOU EVER FELT YOU SHOULD CUT DOWN ON YOUR DRINKING: NO
EVER HAD A DRINK FIRST THING IN THE MORNING TO STEADY YOUR NERVES TO GET RID OF A HANGOVER: NO
EVER FELT BAD OR GUILTY ABOUT YOUR DRINKING: NO
DOES PATIENT WANT TO STOP DRINKING: NO
AVERAGE NUMBER OF DAYS PER WEEK YOU HAVE A DRINK CONTAINING ALCOHOL: 0
ON A TYPICAL DAY WHEN YOU DRINK ALCOHOL HOW MANY DRINKS DO YOU HAVE: 0
TOTAL SCORE: 0
ALCOHOL_USE: NO
CONSUMPTION TOTAL: NEGATIVE

## 2024-09-06 ASSESSMENT — COGNITIVE AND FUNCTIONAL STATUS - GENERAL
SUGGESTED CMS G CODE MODIFIER DAILY ACTIVITY: CH
MOVING FROM LYING ON BACK TO SITTING ON SIDE OF FLAT BED: A LITTLE
TURNING FROM BACK TO SIDE WHILE IN FLAT BAD: A LITTLE
DAILY ACTIVITIY SCORE: 24
MOBILITY SCORE: 21
SUGGESTED CMS G CODE MODIFIER MOBILITY: CJ
MOVING TO AND FROM BED TO CHAIR: A LITTLE

## 2024-09-06 ASSESSMENT — PATIENT HEALTH QUESTIONNAIRE - PHQ9
1. LITTLE INTEREST OR PLEASURE IN DOING THINGS: NOT AT ALL
SUM OF ALL RESPONSES TO PHQ9 QUESTIONS 1 AND 2: 0
2. FEELING DOWN, DEPRESSED, IRRITABLE, OR HOPELESS: NOT AT ALL
SUM OF ALL RESPONSES TO PHQ9 QUESTIONS 1 AND 2: 0
1. LITTLE INTEREST OR PLEASURE IN DOING THINGS: NOT AT ALL
2. FEELING DOWN, DEPRESSED, IRRITABLE, OR HOPELESS: NOT AT ALL

## 2024-09-06 ASSESSMENT — SOCIAL DETERMINANTS OF HEALTH (SDOH)
WITHIN THE LAST YEAR, HAVE YOU BEEN AFRAID OF YOUR PARTNER OR EX-PARTNER?: NO
WITHIN THE PAST 12 MONTHS, THE FOOD YOU BOUGHT JUST DIDN'T LAST AND YOU DIDN'T HAVE MONEY TO GET MORE: NEVER TRUE
WITHIN THE LAST YEAR, HAVE YOU BEEN HUMILIATED OR EMOTIONALLY ABUSED IN OTHER WAYS BY YOUR PARTNER OR EX-PARTNER?: NO
IN THE PAST 12 MONTHS, HAS THE ELECTRIC, GAS, OIL, OR WATER COMPANY THREATENED TO SHUT OFF SERVICE IN YOUR HOME?: NO
WITHIN THE PAST 12 MONTHS, YOU WORRIED THAT YOUR FOOD WOULD RUN OUT BEFORE YOU GOT THE MONEY TO BUY MORE: NEVER TRUE
WITHIN THE LAST YEAR, HAVE TO BEEN RAPED OR FORCED TO HAVE ANY KIND OF SEXUAL ACTIVITY BY YOUR PARTNER OR EX-PARTNER?: NO
WITHIN THE LAST YEAR, HAVE YOU BEEN KICKED, HIT, SLAPPED, OR OTHERWISE PHYSICALLY HURT BY YOUR PARTNER OR EX-PARTNER?: NO

## 2024-09-06 ASSESSMENT — FIBROSIS 4 INDEX
FIB4 SCORE: 4.93
FIB4 SCORE: 4.93

## 2024-09-06 NOTE — ED NOTES
Report given to Selma VITAL RN. At this time pt still resting in bed with daughter at bedside. No acute distress noted and pt's breaths are even and unlabored. This RN removed from care.

## 2024-09-06 NOTE — ED NOTES
Report given to Chaka JENKINS. Safety measures in place. Call light within reach. Care relinquished.

## 2024-09-06 NOTE — ED TRIAGE NOTES
Chief Complaint   Patient presents with    Abdominal Pain    Back Pain     Pt reports upper abdominal pain that radiates to her back that started approx 3 days prior. Pt reports n/v over last 3 days. Denies diarrhea.     N/V   Pt wheeled to triage for above complaint with family. Pt reports upper abdominal/epigastric pain that radiates to her mid back with n/v. Last episode of emesis approx 10 minutes prior. Denies diarrhea. Pt seen here yesterday for the same and was dx with gastritis. Pt states that the pain is intermittent and she reports some relief with vomiting. Denies chest pain/SOB. All abdominal organs intact, denies history of abdominal surgery.     EKG and abdominal protocol ordered.    Patient and staff wearing appropriate PPE.

## 2024-09-06 NOTE — H&P
Hospital Medicine History & Physical Note    Date of Service  9/6/2024    Primary Care Physician  Pcp Pt States None    Consultants  Dr. Saini, Gen surgery    Code Status  Full Code    Chief Complaint  Chief Complaint   Patient presents with    Abdominal Pain    Back Pain     Pt reports upper abdominal pain that radiates to her back that started approx 3 days prior. Pt reports n/v over last 3 days. Denies diarrhea.     N/V       History of Presenting Illness  Gena Rosas is a 64 y.o. female past medical history of type 2 diabetes, hypothyroidism, hypertension, who presented 9/5/2024 with complaints of right upper quadrant pain, nausea and vomiting for 3 days.  Upon evaluation with ultrasound of right upper quadrant, she found to have cholelithiasis and gallbladder wall thickening consistent with cholecystitis.  There is no CBD dilation.  Blood work showed lipase greater than 3000, , .  Dr. Saini from general surgery consulted and recommended supportive care and treatment for pancreatitis for now, with delayed cholecystectomy next 2 days.  If bilirubin goes up, may need to consult GI for ERCP    I discussed the plan of care with patient.    Review of Systems  Review of Systems   Constitutional:  Negative for chills, fever and weight loss.   HENT:  Negative for ear pain, hearing loss and tinnitus.    Eyes:  Negative for blurred vision, double vision and photophobia.   Respiratory:  Negative for cough, hemoptysis and sputum production.    Cardiovascular:  Negative for chest pain, palpitations and orthopnea.   Gastrointestinal:  Positive for abdominal pain, nausea and vomiting. Negative for blood in stool, constipation, diarrhea and heartburn.   Genitourinary:  Negative for dysuria, flank pain, frequency and hematuria.   Musculoskeletal:  Negative for back pain, joint pain and neck pain.   Skin:  Negative for itching and rash.   Neurological:  Negative for tremors, speech change, focal  weakness and headaches.   Endo/Heme/Allergies:  Negative for environmental allergies and polydipsia. Does not bruise/bleed easily.   Psychiatric/Behavioral:  Negative for hallucinations and substance abuse. The patient is not nervous/anxious.        Past Medical History   has a past medical history of Diabetes (HCC) and Thyroid disease.    Surgical History   has no past surgical history on file.     Family History  family history is not on file.   Family history reviewed with patient. There is no family history that is pertinent to the chief complaint.     Social History   reports that she quit smoking about 11 years ago. She has never used smokeless tobacco. She reports that she does not drink alcohol and does not use drugs.    Allergies  Allergies   Allergen Reactions    Nkda [No Known Drug Allergy]        Medications  Prior to Admission Medications   Prescriptions Last Dose Informant Patient Reported? Taking?   atorvastatin (LIPITOR) 20 MG Tab   Yes No   Sig: Take 20 mg by mouth every evening.   levothyroxine (SYNTHROID) 100 MCG TABS   Yes No   Sig: Take 100 mcg by mouth Every morning on an empty stomach.   lisinopril (PRINIVIL) 5 MG Tab   Yes No   Sig: Take 5 mg by mouth every day.   metformin (GLUCOPHAGE) 1000 MG tablet   Yes No   Sig: Take 1,000 mg by mouth 2 times a day, with meals.   omeprazole (PRILOSEC) 40 MG delayed-release capsule   No No   Sig: Take 1 Capsule by mouth every day.   ondansetron (ZOFRAN ODT) 4 MG TABLET DISPERSIBLE   No No   Sig: Take 1 Tablet by mouth every 6 hours as needed for Nausea. Dissolve in mouth.   sucralfate (CARAFATE) 1 GM Tab   No No   Sig: Take 1 Tablet by mouth 4 Times a Day,Before Meals and at Bedtime.      Facility-Administered Medications: None       Physical Exam  Temp:  [37.5 °C (99.5 °F)] 37.5 °C (99.5 °F)  Pulse:  [69] 69  Resp:  [18] 18  BP: (111)/(78) 111/78  SpO2:  [97 %] 97 %  Blood Pressure: 111/78   Temperature: 37.5 °C (99.5 °F)   Pulse: 69   Respiration: 18  "  Pulse Oximetry: 97 %       Physical Exam  Vitals and nursing note reviewed.   Constitutional:       General: She is not in acute distress.     Appearance: Normal appearance.   HENT:      Head: Normocephalic and atraumatic.      Nose: Nose normal.      Mouth/Throat:      Mouth: Mucous membranes are moist.   Eyes:      Extraocular Movements: Extraocular movements intact.      Pupils: Pupils are equal, round, and reactive to light.   Cardiovascular:      Rate and Rhythm: Normal rate and regular rhythm.   Pulmonary:      Effort: Pulmonary effort is normal.      Breath sounds: Normal breath sounds.   Abdominal:      General: Abdomen is flat. There is no distension.      Tenderness: There is abdominal tenderness in the right upper quadrant. There is no guarding or rebound.   Musculoskeletal:         General: No swelling or deformity. Normal range of motion.      Cervical back: Normal range of motion and neck supple.   Skin:     General: Skin is warm and dry.   Neurological:      General: No focal deficit present.      Mental Status: She is alert and oriented to person, place, and time.   Psychiatric:         Mood and Affect: Mood normal.         Behavior: Behavior normal.         Laboratory:  Recent Labs     09/04/24 0109 09/05/24 2153   WBC 9.2 10.8   RBC 4.38 4.47   HEMOGLOBIN 13.6 13.7   HEMATOCRIT 40.1 40.8   MCV 91.6 91.3   MCH 31.1 30.6   MCHC 33.9 33.6   RDW 44.6 44.7   PLATELETCT 254 258   MPV 10.2 10.0     Recent Labs     09/04/24 0109 09/05/24 2153   SODIUM 137 135   POTASSIUM 4.1 3.4*   CHLORIDE 102 98   CO2 21 23   GLUCOSE 172* 144*   BUN 15 12   CREATININE 0.62 0.65   CALCIUM 9.2 10.2     Recent Labs     09/04/24  0109 09/05/24  2153   ALTSGPT 107* 372*   ASTSGOT 218* 447*   ALKPHOSPHAT 83 154*   TBILIRUBIN 0.9 1.4   LIPASE 27 >3000*   GLUCOSE 172* 144*         No results for input(s): \"NTPROBNP\" in the last 72 hours.      Recent Labs     09/04/24 0109   TROPONINT <6       Imaging:  US-RUQ   Final " Result         1.  Cholelithiasis with gallbladder wall thickening, sonographically compatible with cholecystitis.   2.  Hepatomegaly   3.  Echogenic liver, compatible with fatty change versus fibrosis.   4.  Mild dilatation right renal pelvis compatible with extrarenal pelvis morphology or mild hydronephrosis.            Assessment/Plan:  Justification for Admission Status  I anticipate this patient will require at least two midnights for appropriate medical management, necessitating inpatient admission because pending LFTs, lipase, IV rehydration, cholecystectomy    Patient will need a Med/Surg bed on MEDICAL service .  The need is secondary to pancreatitis.    * Cholecystitis- (present on admission)  Assessment & Plan  64-year-old female presented with right upper quadrant pain nausea vomiting for 3 days  Right upper quadrant ultrasound: Cholelithiasis, gallbladder wall thickening  Plan for definitive treatment as per surgery after pancreatitis cools down  No antibiotics for now since WBC is not elevated  Symptomatic treatment with Zofran, morphine as needed  IV rehydration, Pepcid    Gallstone pancreatitis  Assessment & Plan  Continue IV hydration  Zofran as needed  Clear liquid diet as tolerated  Cholecystectomy is planned in the next 2 days    Hypertension  Assessment & Plan  Will hold lisinopril due to dehydration.  Monitor blood pressure, restart if necessary    Dyslipidemia  Assessment & Plan  Will hold Lipitor due to elevated LFTs    Hypokalemia  Assessment & Plan  Potassium 3.4.  IV saline with potassium 20 mill equivalents started 83 cc/h    Type 2 diabetes mellitus (HCC)  Assessment & Plan  Controlled with hyperglycemia  Will hold metformin and order insulin sliding scale        VTE prophylaxis: enoxaparin ppx

## 2024-09-06 NOTE — ASSESSMENT & PLAN NOTE
64-year-old female presented with right upper quadrant pain nausea vomiting for 3 days  Right upper quadrant ultrasound: Cholelithiasis, gallbladder wall thickening  Plan for definitive treatment as per surgery after pancreatitis cools down  No antibiotics for now since WBC is not elevated  Symptomatic treatment with Zofran, morphine as needed  IV rehydration, Pepcid  9/7: MRCP with multiple stones seen in gallbladder.   No ductal stones per rad read.  9/8:  npo for lap saturnino.  9/9:  lap saturnino with Dr. Acuna, no complications.

## 2024-09-06 NOTE — PROGRESS NOTES
Hospital Medicine Daily Progress Note    Date of Service  9/6/2024    Chief Complaint  Gena Rosas is a 64 y.o. female admitted 9/5/2024 with N/V/RUQ abdominal pain x 3 days.    Hospital Course  Gena Rosas is a 64 y.o. female past medical history of type 2 diabetes, hypothyroidism, hypertension, who presented 9/5/2024 with complaints of right upper quadrant pain, nausea and vomiting for 3 days.  Upon evaluation with ultrasound of right upper quadrant, she found to have cholelithiasis and gallbladder wall thickening consistent with cholecystitis.  There is no CBD dilation.  Blood work showed lipase greater than 3000, , .  Dr. Saini from general surgery consulted and recommended supportive care and treatment for pancreatitis for now, with delayed cholecystectomy next 2 days.  If bilirubin goes up, may need to consult GI for ERCP    Interval Problem Update  9/6: no longer with emesis, tolerating CLD. MRCP ordered to rule out choledocholithiasis.  Son at bedside.      I have discussed this patient's plan of care and discharge plan at IDT rounds today with Case Management, Nursing, Nursing leadership, and other members of the IDT team.    Consultants/Specialty  general surgery    Code Status  Full Code    Disposition  The patient is not medically cleared for discharge to home or a post-acute facility.  Anticipate discharge to: home with close outpatient follow-up    I have placed the appropriate orders for post-discharge needs.    Review of Systems  Review of Systems   Constitutional:  Negative for chills, diaphoresis, fever and malaise/fatigue.   HENT:  Negative for congestion and sore throat.    Eyes:  Negative for pain and discharge.   Respiratory:  Negative for cough, hemoptysis, sputum production, shortness of breath and wheezing.    Cardiovascular:  Negative for chest pain, palpitations, claudication and leg swelling.   Gastrointestinal:  Positive for abdominal  pain, nausea and vomiting. Negative for constipation, diarrhea and melena.   Genitourinary:  Negative for dysuria, frequency and urgency.   Musculoskeletal:  Positive for back pain. Negative for joint pain, myalgias and neck pain.   Skin:  Negative for itching and rash.   Neurological:  Negative for dizziness, sensory change, speech change, focal weakness, loss of consciousness, weakness and headaches.   Endo/Heme/Allergies:  Does not bruise/bleed easily.   Psychiatric/Behavioral:  Negative for depression, substance abuse and suicidal ideas.         Physical Exam  Temp:  [36.6 °C (97.8 °F)-37.5 °C (99.5 °F)] 36.6 °C (97.8 °F)  Pulse:  [48-69] 56  Resp:  [16-18] 16  BP: (100-151)/(52-78) 109/56  SpO2:  [86 %-97 %] 95 %    Physical Exam  Vitals and nursing note reviewed.   Constitutional:       General: She is not in acute distress.     Appearance: She is well-developed. She is obese. She is not diaphoretic.   HENT:      Head: Normocephalic and atraumatic.      Nose: Nose normal.      Mouth/Throat:      Pharynx: No oropharyngeal exudate.   Eyes:      General: No scleral icterus.        Right eye: No discharge.         Left eye: No discharge.      Conjunctiva/sclera: Conjunctivae normal.      Pupils: Pupils are equal, round, and reactive to light.   Neck:      Thyroid: No thyromegaly.      Vascular: No JVD.      Trachea: No tracheal deviation.   Cardiovascular:      Rate and Rhythm: Normal rate and regular rhythm.      Heart sounds: Normal heart sounds. No murmur heard.     No friction rub. No gallop.   Pulmonary:      Effort: Pulmonary effort is normal. No respiratory distress.      Breath sounds: Normal breath sounds. No stridor. No wheezing or rales.   Chest:      Chest wall: No tenderness.   Abdominal:      General: Bowel sounds are normal. There is no distension.      Palpations: Abdomen is soft. There is no mass.      Tenderness: There is abdominal tenderness (ruq). There is no guarding or rebound.    Musculoskeletal:         General: No tenderness. Normal range of motion.      Cervical back: Normal range of motion and neck supple.   Lymphadenopathy:      Cervical: No cervical adenopathy.   Skin:     General: Skin is warm and dry.      Capillary Refill: Capillary refill takes less than 2 seconds.      Findings: No erythema or rash.   Neurological:      General: No focal deficit present.      Mental Status: She is alert and oriented to person, place, and time.      Cranial Nerves: No cranial nerve deficit.      Motor: No abnormal muscle tone.      Coordination: Coordination normal.   Psychiatric:         Mood and Affect: Mood normal.         Behavior: Behavior normal.         Thought Content: Thought content normal.         Judgment: Judgment normal.         Fluids  No intake or output data in the 24 hours ending 09/06/24 1608     Laboratory  Recent Labs     09/04/24 0109 09/05/24 2153 09/06/24  0337   WBC 9.2 10.8 7.9   RBC 4.38 4.47 3.77*   HEMOGLOBIN 13.6 13.7 11.7*   HEMATOCRIT 40.1 40.8 34.6*   MCV 91.6 91.3 91.8   MCH 31.1 30.6 31.0   MCHC 33.9 33.6 33.8   RDW 44.6 44.7 45.5   PLATELETCT 254 258 231   MPV 10.2 10.0 10.4     Recent Labs     09/04/24 0109 09/05/24 2153 09/06/24  0337   SODIUM 137 135 141   POTASSIUM 4.1 3.4* 3.9   CHLORIDE 102 98 108   CO2 21 23 23   GLUCOSE 172* 144* 127*   BUN 15 12 11   CREATININE 0.62 0.65 0.67   CALCIUM 9.2 10.2 8.5                   Imaging  US-RUQ   Final Result         1.  Cholelithiasis with gallbladder wall thickening, sonographically compatible with cholecystitis.   2.  Hepatomegaly   3.  Echogenic liver, compatible with fatty change versus fibrosis.   4.  Mild dilatation right renal pelvis compatible with extrarenal pelvis morphology or mild hydronephrosis.      [id number]/O    (Results Pending)        Assessment/Plan  * Cholecystitis- (present on admission)  Assessment & Plan  64-year-old female presented with right upper quadrant pain nausea vomiting for  3 days  Right upper quadrant ultrasound: Cholelithiasis, gallbladder wall thickening  Plan for definitive treatment as per surgery after pancreatitis cools down  No antibiotics for now since WBC is not elevated  Symptomatic treatment with Zofran, morphine as needed  IV rehydration, Pepcid    Hypertension  Assessment & Plan  Will hold lisinopril due to dehydration.  Monitor blood pressure, restart if necessary    Dyslipidemia  Assessment & Plan  Will hold Lipitor due to elevated LFTs    Hypokalemia  Assessment & Plan  Potassium 3.4.  IV saline with potassium 20 mill equivalents started 83 cc/h    Type 2 diabetes mellitus (HCC)  Assessment & Plan  Controlled with hyperglycemia  Will hold metformin and order insulin sliding scale    Gallstone pancreatitis  Assessment & Plan  Continue IV hydration  Zofran as needed  Clear liquid diet as tolerated  Cholecystectomy is planned in the next 2 days         VTE prophylaxis:   SCDs/TEDs      I have performed a physical exam and reviewed and updated ROS and Plan today (9/6/2024). In review of yesterday's note (9/5/2024), there are no changes except as documented above.

## 2024-09-06 NOTE — ASSESSMENT & PLAN NOTE
Continue IV hydration  Zofran as needed  Clear liquid diet as tolerated  MRCP no ductal stone seen.  9/9 lap saturnino, no complications.

## 2024-09-06 NOTE — ED NOTES
Pt brought from lobby to [place] [id number] via wheelchair. Pt placed in hospital gown and is now sitting up in bed talking to family at bedside with even and unlabored breaths, in no apparent distress at this time. Will continue to monitor pt while awaiting orders.

## 2024-09-06 NOTE — ED NOTES
Med rec complete per patient via  with med bottles at bedside  Allergies reviewed.   Outpatient antibiotics in the last 30 days? No   Anticoagulants taken in the last 14 days? No    Pharmacy patient utilizes: [facility] [phone]    Brielle Lopez CPhT

## 2024-09-06 NOTE — CONSULTS
DATE OF CONSULTATION:  9/6/2024     REFERRING PHYSICIAN:   NICOLA Weldon     CONSULTING PHYSICIAN:  Ted Saini M.D.     REASON FOR CONSULTATION:  I have been asked by Dr. Weldon to see the patient in surgical consultation for evaluation of gallstone pancreatitis and cholecystitis.    HISTORY OF PRESENT ILLNESS: The patient is a 64 year-old  woman who presents to the Emergency Department with a 4-days history of moderate and severe epigastric abdominal pain. The pain is associated with nausea and vomiting.  The patient has a history of gastritis for which she has had similar episodes of pain as to now, but normally her protonix stops the pain. The patient denies any history of previous abdominal surgery.     PAST MEDICAL HISTORY:  has a past medical history of Diabetes (HCC) and Hypothyroidism, and hypertension    She has no past medical history of ASTHMA, CAD (coronary artery disease), Cancer (HCC), Congestive heart failure (HCC), COPD, , Infectious disease, Liver disease, Psychiatric disorder, Renal disorder, Seizure disorder (HCC), or Stroke (HCC).    PAST SURGICAL HISTORY:  has no past surgical history on file.    ALLERGIES:   Allergies   Allergen Reactions    Nkda [No Known Drug Allergy]        CURRENT MEDICATIONS:    Home Medications       Reviewed by Jessica Aleman R.N. (Registered Nurse) on 09/05/24 at 2116  Med List Status: Partial     Medication Last Dose Status   atorvastatin (LIPITOR) 20 MG Tab  Active   levothyroxine (SYNTHROID) 100 MCG TABS  Active   lisinopril (PRINIVIL) 5 MG Tab  Active   metformin (GLUCOPHAGE) 1000 MG tablet  Active   omeprazole (PRILOSEC) 40 MG delayed-release capsule  Active   ondansetron (ZOFRAN ODT) 4 MG TABLET DISPERSIBLE  Active   sucralfate (CARAFATE) 1 GM Tab  Active                  Audit from Redirected Encounters    **Home medications have not yet been reviewed for this encounter**         FAMILY HISTORY: family history is not on file.    SOCIAL HISTORY:   reports that she quit smoking about 11 years ago. She has never used smokeless tobacco. She reports that she does not drink alcohol and does not use drugs.    REVIEW OF SYSTEMS: Review of systems is remarkable for the following abdominal pain. The remainder of the comprehensive ROS is negative with the exception of the aforementioned HPI, PMH, and PSH bullets in accordance with CMS guideline.    PHYSICAL EXAMINATION:    Physical Exam  Vitals and nursing note reviewed. Exam conducted with a chaperone present.   Constitutional:       General: She is not in acute distress.     Appearance: Normal appearance. She is obese. She is not ill-appearing.   HENT:      Head: Normocephalic.      Right Ear: External ear normal.      Left Ear: External ear normal.      Nose: Nose normal.      Mouth/Throat:      Mouth: Mucous membranes are moist.      Pharynx: Oropharynx is clear.   Eyes:      Conjunctiva/sclera: Conjunctivae normal.   Cardiovascular:      Rate and Rhythm: Normal rate and regular rhythm.   Pulmonary:      Effort: Pulmonary effort is normal. No respiratory distress.   Abdominal:      General: Abdomen is flat.      Palpations: Abdomen is soft.      Tenderness: There is no abdominal tenderness (Moderate epigastric and RUQ tenderness).   Musculoskeletal:      Right lower leg: Edema present.      Left lower leg: Edema present.   Skin:     General: Skin is warm.      Capillary Refill: Capillary refill takes less than 2 seconds.      Coloration: Skin is not jaundiced.   Neurological:      General: No focal deficit present.      Mental Status: She is alert and oriented to person, place, and time.         LABORATORY VALUES:   Recent Labs     09/04/24  0109 09/05/24  2153   WBC 9.2 10.8   RBC 4.38 4.47   HEMOGLOBIN 13.6 13.7   HEMATOCRIT 40.1 40.8   MCV 91.6 91.3   MCH 31.1 30.6   MCHC 33.9 33.6   RDW 44.6 44.7   PLATELETCT 254 258   MPV 10.2 10.0     Recent Labs     09/04/24  0109 09/05/24  2153   SODIUM 137 135   POTASSIUM  4.1 3.4*   CHLORIDE 102 98   CO2 21 23   GLUCOSE 172* 144*   BUN 15 12   CREATININE 0.62 0.65   CALCIUM 9.2 10.2     Recent Labs     09/04/24  0109 09/05/24  2153   ASTSGOT 218* 447*   ALTSGPT 107* 372*   TBILIRUBIN 0.9 1.4   ALKPHOSPHAT 83 154*   GLOBULIN 3.5 4.1*            IMAGING:   US-RUQ   Final Result         1.  Cholelithiasis with gallbladder wall thickening, sonographically compatible with cholecystitis.   2.  Hepatomegaly   3.  Echogenic liver, compatible with fatty change versus fibrosis.   4.  Mild dilatation right renal pelvis compatible with extrarenal pelvis morphology or mild hydronephrosis.          ASSESSMENT AND PLAN:     64 year old woman with gallstone pancreatitis  She is indicated for same admission cholecystectomy when abdominal tenderness has resolved or shown significant improvement  Recommend trending lipase until plateau is reached  She needs to have repeat CMP in AM to confirm no biliary obstruction  If bilirubin increases then consider GI consult for possible ERCP      DISPOSITION: Medical evaluation and admission. The patient was admitted to the Medical Service prior to surgical consultation. [facility] Care Surgery [facility] Service will follow.     ____________________________________     Ted Saini M.D.    DD: 9/6/2024  12:13 AM    AAST Grading System for EGS Conditions  ACS NSQIP Surgical Risk Calculator

## 2024-09-06 NOTE — ED NOTES
Patient remains comfortable on gurney, no identifiable needs at this time. Equal chest rise and fall bilaterally. Pending admission bed. Safety measures in place, call light within reach.

## 2024-09-06 NOTE — ED PROVIDER NOTES
ED Provider Note    CHIEF COMPLAINT  Chief Complaint   Patient presents with    Abdominal Pain    Back Pain     Pt reports upper abdominal pain that radiates to her back that started approx 3 days prior. Pt reports n/v over last 3 days. Denies diarrhea.     N/V     EXTERNAL RECORDS REVIEWED  Patient was seen yesterday for abdominal pain and diagnosed with gastritis.  She was restarted on PPI.  Labs did show elevation of liver function test.  She was last seen at an outpatient office visit in 2021 and had a diagnosis of gastritis prescribed omeprazole and sucralfate at that time.    HPI/ROS  LIMITATION TO HISTORY   Select: : None  OUTSIDE HISTORIAN(S):  None    Gena Rosas is a 64 y.o. female who presents to the Emergency Department with epigastric and right upper quadrant abdominal pain.  Patient states that this has been going on for the last 2 to 3 days.  The pain is severe and radiates to her back at times.  She has had associated nausea and vomiting.  Pain is definitely worse following eating.  No chest pain or shortness of breath.  She has been having normal bowel movements and urination.  She was seen yesterday and restarted on pantoprazole which she is taking however is not feeling any better.  No prior abdominal surgeries.    PAST MEDICAL HISTORY  Past Medical History:   Diagnosis Date    Diabetes (HCC)     Hypertension 9/6/2024    Thyroid disease         SURGICAL HISTORY  History reviewed. No pertinent surgical history.     FAMILY HISTORY  History reviewed. No pertinent family history.    SOCIAL HISTORY   reports that she quit smoking about 11 years ago. She has never used smokeless tobacco. She reports that she does not drink alcohol and does not use drugs.    CURRENT MEDICATIONS  Previous Medications    ACETAMINOPHEN (TYLENOL) 500 MG TAB    Take 500-1,000 mg by mouth every 6 hours as needed.    LEVOTHYROXINE (SYNTHROID) 100 MCG TABS    Take 100 mcg by mouth Every morning on an empty  "stomach.    LISINOPRIL (PRINIVIL) 40 MG TABLET    Take 40 mg by mouth every day.    METFORMIN (GLUCOPHAGE) 1000 MG TABLET    Take 1,000 mg by mouth 2 times a day, with meals.    PANTOPRAZOLE (PROTONIX) 40 MG TABLET DELAYED RESPONSE    Take 40 mg by mouth every day.       ALLERGIES  Nkda [no known drug allergy]    PHYSICAL EXAM  /61   Pulse (!) 55   Temp 37.5 °C (99.5 °F) (Temporal)   Resp 16   Ht 1.651 m (5' 5\")   Wt 90.7 kg (200 lb)   SpO2 94%      Constitutional: Nontoxic appearing. Alert in no apparent distress.  HENT: Normocephalic, Atraumatic. Bilateral external ears normal. Nose normal.  Moist mucous membranes.  Oropharynx clear.  Eyes: Pupils are equal and reactive. Conjunctiva normal.   Neck: Supple, full range of motion  Heart: Regular rate and rhythm.  No murmurs.    Lungs: No respiratory distress, normal work of breathing. Lungs clear to auscultation bilaterally.  Abdomen Soft, no distention.  Epigastric and right upper quadrant tenderness to palpation.  Negative Jean Baptiste sign.  Musculoskeletal: Atraumatic. No obvious deformities noted.  No lower extremity edema.  Skin: Warm, Dry.  No erythema, No rash.   Neurologic: Alert and oriented x3. Moving all extremities spontaneously without focal deficits.  Psychiatric: Affect normal, Mood normal, Appears appropriate and not intoxicated.      DIAGNOSTIC STUDIES / PROCEDURES        LABS  Labs Reviewed   CBC WITH DIFFERENTIAL - Abnormal; Notable for the following components:       Result Value    Lymphocytes 19.70 (*)     Neutrophils (Absolute) 7.69 (*)     Monos (Absolute) 0.86 (*)     All other components within normal limits   COMP METABOLIC PANEL - Abnormal; Notable for the following components:    Potassium 3.4 (*)     Glucose 144 (*)     AST(SGOT) 447 (*)     ALT(SGPT) 372 (*)     Alkaline Phosphatase 154 (*)     Globulin 4.1 (*)     All other components within normal limits   LIPASE - Abnormal; Notable for the following components:    Lipase >3000 " (*)     All other components within normal limits   COMP METABOLIC PANEL - Abnormal; Notable for the following components:    Glucose 127 (*)     AST(SGOT) 338 (*)     ALT(SGPT) 339 (*)     Alkaline Phosphatase 132 (*)     All other components within normal limits   CBC WITH DIFFERENTIAL - Abnormal; Notable for the following components:    RBC 3.77 (*)     Hemoglobin 11.7 (*)     Hematocrit 34.6 (*)     All other components within normal limits   LIPASE - Abnormal; Notable for the following components:    Lipase 1914 (*)     All other components within normal limits   POCT GLUCOSE DEVICE RESULTS - Abnormal; Notable for the following components:    POC Glucose, Blood 169 (*)     All other components within normal limits   URINALYSIS   ESTIMATED GFR   ESTIMATED GFR         RADIOLOGY  I have independently interpreted the diagnostic imaging associated with this visit and am waiting the final reading from the radiologist.   My preliminary interpretation is as follows: cholelithiasis    Radiologist interpretation:  US-RUQ   Final Result         1.  Cholelithiasis with gallbladder wall thickening, sonographically compatible with cholecystitis.   2.  Hepatomegaly   3.  Echogenic liver, compatible with fatty change versus fibrosis.   4.  Mild dilatation right renal pelvis compatible with extrarenal pelvis morphology or mild hydronephrosis.            COURSE & MEDICAL DECISION MAKING      ASSESSMENT, COURSE AND PLAN  Care Narrative: Middle-age female patient with history of gastritis who presents with worsening upper abdominal pain since visit yesterday.  She is afebrile with normal vital signs on arrival.  Labs do not show significant leukocytosis concerning for sepsis or significant infectious process.  Transaminitis has worsened from yesterday however bilirubin remains normal.  Lipase demonstrates acute pancreatitis as well.  Right upper quadrant ultrasound demonstrates cholelithiasis with concerns for cholecystitis  however there is no evidence of biliary dilation concerning for obstructive process.     12:22 AM - I discussed the case with Dr. Saini with general surgery.  He is recommending admission to medicine for treatment of her pancreatitis.  She will ultimately need cholecystectomy.  He recommends repeating labs in the morning to assess for biliary obstruction and if this is present then getting GI involved.  He feels it is okay to hold off on antibiotics for now.     Upon reassessment, patient is resting comfortably with normal vital signs.  No new complaints at this time.  Discussed results with patient and/or family as well as plan of care for admission.  Patient is agreeable.      ADDITIONAL PROBLEM LIST  Problem #1: Acute cholecystitis -discussed with general surgery who will follow, no need for antibiotics at this time    Problem #2: Acute pancreatitis -likely related to gallstones however no signs of obstructive process, discussed with surgery with plan for admission for monitoring of labs      DISPOSITION AND DISCUSSIONS  I have discussed management of the patient with the following physicians and RODNEY's:  Dr. Saini, general surgery   Dr. Oliva, hospitalist      Decision tools and prescription drugs considered including, but not limited to: Antibiotics no signs of sepsis or severe cholecystitis at this time .    DISPOSITION:  Patient will be hospitalized by Dr. Oliva in guarded condition.      FINAL DIAGNOSIS  1. Acute cholecystitis    2. Gallstone pancreatitis

## 2024-09-06 NOTE — ED NOTES
Report given to ALICE Hassan and transport staff. Pt is a&ox4, resps even and unlabored. Vss prior to transport. Pt transported without incident.

## 2024-09-07 ENCOUNTER — APPOINTMENT (OUTPATIENT)
Dept: RADIOLOGY | Facility: MEDICAL CENTER | Age: 64
DRG: 417 | End: 2024-09-07
Attending: HOSPITALIST
Payer: COMMERCIAL

## 2024-09-07 PROBLEM — K80.43 CHOLEDOCHOLITHIASIS WITH ACUTE CHOLECYSTITIS WITH OBSTRUCTION: Status: ACTIVE | Noted: 2024-09-06

## 2024-09-07 LAB
ALBUMIN SERPL BCP-MCNC: 3.4 G/DL (ref 3.2–4.9)
ALBUMIN/GLOB SERPL: 1.1 G/DL
ALP SERPL-CCNC: 120 U/L (ref 30–99)
ALT SERPL-CCNC: 213 U/L (ref 2–50)
ANION GAP SERPL CALC-SCNC: 13 MMOL/L (ref 7–16)
AST SERPL-CCNC: 109 U/L (ref 12–45)
BASOPHILS # BLD AUTO: 0.3 % (ref 0–1.8)
BASOPHILS # BLD: 0.03 K/UL (ref 0–0.12)
BILIRUB SERPL-MCNC: 0.4 MG/DL (ref 0.1–1.5)
BUN SERPL-MCNC: 7 MG/DL (ref 8–22)
CALCIUM ALBUM COR SERPL-MCNC: 9.4 MG/DL (ref 8.5–10.5)
CALCIUM SERPL-MCNC: 8.9 MG/DL (ref 8.5–10.5)
CHLORIDE SERPL-SCNC: 107 MMOL/L (ref 96–112)
CO2 SERPL-SCNC: 22 MMOL/L (ref 20–33)
CREAT SERPL-MCNC: 0.57 MG/DL (ref 0.5–1.4)
EOSINOPHIL # BLD AUTO: 0.11 K/UL (ref 0–0.51)
EOSINOPHIL NFR BLD: 1.3 % (ref 0–6.9)
ERYTHROCYTE [DISTWIDTH] IN BLOOD BY AUTOMATED COUNT: 46.5 FL (ref 35.9–50)
GFR SERPLBLD CREATININE-BSD FMLA CKD-EPI: 102 ML/MIN/1.73 M 2
GLOBULIN SER CALC-MCNC: 3.1 G/DL (ref 1.9–3.5)
GLUCOSE BLD STRIP.AUTO-MCNC: 102 MG/DL (ref 65–99)
GLUCOSE BLD STRIP.AUTO-MCNC: 105 MG/DL (ref 65–99)
GLUCOSE BLD STRIP.AUTO-MCNC: 95 MG/DL (ref 65–99)
GLUCOSE BLD STRIP.AUTO-MCNC: 98 MG/DL (ref 65–99)
GLUCOSE SERPL-MCNC: 99 MG/DL (ref 65–99)
HCT VFR BLD AUTO: 34.6 % (ref 37–47)
HGB BLD-MCNC: 11.4 G/DL (ref 12–16)
IMM GRANULOCYTES # BLD AUTO: 0.02 K/UL (ref 0–0.11)
IMM GRANULOCYTES NFR BLD AUTO: 0.2 % (ref 0–0.9)
LIPASE SERPL-CCNC: 91 U/L (ref 11–82)
LYMPHOCYTES # BLD AUTO: 2.86 K/UL (ref 1–4.8)
LYMPHOCYTES NFR BLD: 33.1 % (ref 22–41)
MCH RBC QN AUTO: 30.6 PG (ref 27–33)
MCHC RBC AUTO-ENTMCNC: 32.9 G/DL (ref 32.2–35.5)
MCV RBC AUTO: 93 FL (ref 81.4–97.8)
MONOCYTES # BLD AUTO: 0.6 K/UL (ref 0–0.85)
MONOCYTES NFR BLD AUTO: 6.9 % (ref 0–13.4)
NEUTROPHILS # BLD AUTO: 5.03 K/UL (ref 1.82–7.42)
NEUTROPHILS NFR BLD: 58.2 % (ref 44–72)
NRBC # BLD AUTO: 0 K/UL
NRBC BLD-RTO: 0 /100 WBC (ref 0–0.2)
PLATELET # BLD AUTO: 222 K/UL (ref 164–446)
PMV BLD AUTO: 10.3 FL (ref 9–12.9)
POTASSIUM SERPL-SCNC: 3.9 MMOL/L (ref 3.6–5.5)
PROT SERPL-MCNC: 6.5 G/DL (ref 6–8.2)
RBC # BLD AUTO: 3.72 M/UL (ref 4.2–5.4)
SODIUM SERPL-SCNC: 142 MMOL/L (ref 135–145)
WBC # BLD AUTO: 8.7 K/UL (ref 4.8–10.8)

## 2024-09-07 PROCEDURE — 99254 IP/OBS CNSLTJ NEW/EST MOD 60: CPT | Performed by: NURSE PRACTITIONER

## 2024-09-07 PROCEDURE — 82962 GLUCOSE BLOOD TEST: CPT

## 2024-09-07 PROCEDURE — 770006 HCHG ROOM/CARE - MED/SURG/GYN SEMI*

## 2024-09-07 PROCEDURE — 85025 COMPLETE CBC W/AUTO DIFF WBC: CPT

## 2024-09-07 PROCEDURE — 700102 HCHG RX REV CODE 250 W/ 637 OVERRIDE(OP): Performed by: INTERNAL MEDICINE

## 2024-09-07 PROCEDURE — 74181 MRI ABDOMEN W/O CONTRAST: CPT

## 2024-09-07 PROCEDURE — 80053 COMPREHEN METABOLIC PANEL: CPT

## 2024-09-07 PROCEDURE — 99231 SBSQ HOSP IP/OBS SF/LOW 25: CPT | Performed by: NURSE PRACTITIONER

## 2024-09-07 PROCEDURE — 99233 SBSQ HOSP IP/OBS HIGH 50: CPT | Performed by: HOSPITALIST

## 2024-09-07 PROCEDURE — A9270 NON-COVERED ITEM OR SERVICE: HCPCS | Performed by: INTERNAL MEDICINE

## 2024-09-07 PROCEDURE — 700101 HCHG RX REV CODE 250: Performed by: HOSPITALIST

## 2024-09-07 PROCEDURE — 36415 COLL VENOUS BLD VENIPUNCTURE: CPT

## 2024-09-07 PROCEDURE — 700111 HCHG RX REV CODE 636 W/ 250 OVERRIDE (IP): Performed by: HOSPITALIST

## 2024-09-07 PROCEDURE — 83690 ASSAY OF LIPASE: CPT

## 2024-09-07 RX ORDER — CELECOXIB 100 MG/1
200 CAPSULE ORAL 2 TIMES DAILY
Status: DISCONTINUED | OUTPATIENT
Start: 2024-09-08 | End: 2024-09-10 | Stop reason: HOSPADM

## 2024-09-07 RX ORDER — ACETAMINOPHEN 500 MG
1000 TABLET ORAL EVERY 6 HOURS
Status: DISCONTINUED | OUTPATIENT
Start: 2024-09-08 | End: 2024-09-10 | Stop reason: HOSPADM

## 2024-09-07 RX ORDER — INDOMETHACIN 100 MG
100 SUPPOSITORY, RECTAL RECTAL ONCE
Status: DISCONTINUED | OUTPATIENT
Start: 2024-09-07 | End: 2024-09-08

## 2024-09-07 RX ORDER — DIAZEPAM 10 MG/2ML
5 INJECTION, SOLUTION INTRAMUSCULAR; INTRAVENOUS
Status: COMPLETED | OUTPATIENT
Start: 2024-09-07 | End: 2024-09-07

## 2024-09-07 RX ADMIN — SENNOSIDES AND DOCUSATE SODIUM 2 TABLET: 50; 8.6 TABLET ORAL at 18:04

## 2024-09-07 RX ADMIN — LEVOTHYROXINE SODIUM 100 MCG: 0.1 TABLET ORAL at 05:49

## 2024-09-07 RX ADMIN — POTASSIUM CHLORIDE AND SODIUM CHLORIDE: 900; 150 INJECTION, SOLUTION INTRAVENOUS at 13:00

## 2024-09-07 RX ADMIN — POTASSIUM CHLORIDE AND SODIUM CHLORIDE: 900; 150 INJECTION, SOLUTION INTRAVENOUS at 02:37

## 2024-09-07 RX ADMIN — DIAZEPAM 5 MG: 10 INJECTION, SOLUTION INTRAMUSCULAR; INTRAVENOUS at 10:26

## 2024-09-07 ASSESSMENT — ENCOUNTER SYMPTOMS
CHILLS: 0
SPEECH CHANGE: 0
BACK PAIN: 1
WHEEZING: 0
VOMITING: 1
SORE THROAT: 0
SPUTUM PRODUCTION: 0
ABDOMINAL PAIN: 1
DEPRESSION: 0
HEMOPTYSIS: 0
CLAUDICATION: 0
EYE DISCHARGE: 0
FEVER: 0
COUGH: 0
FOCAL WEAKNESS: 0
SENSORY CHANGE: 0
PALPITATIONS: 0
WEAKNESS: 0
NAUSEA: 1
DIZZINESS: 0
DIARRHEA: 0
CONSTIPATION: 0
MYALGIAS: 0
DIAPHORESIS: 0
EYE PAIN: 0
SHORTNESS OF BREATH: 0
LOSS OF CONSCIOUSNESS: 0
BRUISES/BLEEDS EASILY: 0
NECK PAIN: 0
HEADACHES: 0

## 2024-09-07 ASSESSMENT — LIFESTYLE VARIABLES: SUBSTANCE_ABUSE: 0

## 2024-09-07 ASSESSMENT — PAIN DESCRIPTION - PAIN TYPE: TYPE: ACUTE PAIN

## 2024-09-07 NOTE — CARE PLAN
Pt AO x 4.  Pt primarily Bel[place]ian speaking, interpretation services used during care.  Pt denied pain during initial assessment.  Call light and belongings within reach.  Bed locked and in lowest position.  Hourly rounding.  Needs currently met.           The patient is Stable - Low risk of patient condition declining or worsening    Shift Goals  Clinical Goals: safety;MRI  Patient Goals: rest and get better  Family Goals: EMILEE    Progress made toward(s) clinical / shift goals:    Pt was able to tolerate and complete MRI prior to end of shift.  Pt was able to remain incident and fall free.       Problem: Knowledge Deficit - Standard  Goal: Patient and family/care givers will demonstrate understanding of plan of care, disease process/condition, diagnostic tests and medications  Outcome: Progressing     Problem: Pain - Standard  Goal: Alleviation of pain or a reduction in pain to the patient’s comfort goal  Outcome: Progressing

## 2024-09-07 NOTE — PROGRESS NOTES
"  DATE: 9/7/2024    Hospital Day2  Gallstone pancreatitis .    INTERVAL EVENTS:  WBC 8.7 (7.9)  Lipase 91   Bili 0.4  LFT trending down  Tolerating clears  Minimal abdominal pain     REVIEW OF SYSTEMS:  Review of systems is remarkable for the following RUQ tenderness. The remainder of the comprehensive ROS is negative with the exception of the aforementioned HPI, PMH, and PSH bullets in accordance with CMS guideline.    PHYSICAL EXAMINATION:  Vital Signs: /61   Pulse (!) 55   Temp 36 °C (96.8 °F) (Temporal)   Resp 18   Ht 1.651 m (5' 5\")   Wt 98.5 kg (217 lb 2.5 oz)   SpO2 96%   Physical Exam  Vitals and nursing note reviewed.   Constitutional:       General: She is not in acute distress.     Appearance: She is obese. She is not ill-appearing.   HENT:      Mouth/Throat:      Mouth: Mucous membranes are moist.      Pharynx: Oropharynx is clear.   Pulmonary:      Effort: Pulmonary effort is normal. No respiratory distress.   Abdominal:      Comments: Soft   Distended Non distended  Minimal tenderness with palpation in the RUQ and epigastric region   Skin:     General: Skin is warm and dry.      Capillary Refill: Capillary refill takes less than 2 seconds.   Neurological:      Mental Status: She is alert and oriented to person, place, and time.   Psychiatric:         Behavior: Behavior normal.         Thought Content: Thought content normal.       ASSESSMENT AND PLAN:  Gallstone pancreatitis- (present on admission)  Assessment & Plan  US with cholelithiasis with gallbladder wall thickening, sonographically compatible with cholecystitis and hepatomegaly  9/7 MRCP pending  Lap saturnino when acute gallstone pancreatitis resolved  ACS Blue     - MRCP pending  - Exam and labs improving  - Plan lap saturnino this admission     NPO at midnight and will eval in AM for surgical readiness unless MRCP finds a impacted stones then probably ERCP first        ____________________________________     Fawn Martinez, " INDIRA    DD: 9/7/2024  9:06 AM

## 2024-09-07 NOTE — PROGRESS NOTES
4 Eyes Skin Assessment Completed by Elvie Myles, RN and Lexie Gong RN.    Head WDL  Ears WDL  Nose WDL  Mouth WDL  Neck WDL  Breast/Chest WDL  Shoulder Blades WDL  Spine WDL  (R) Arm/Elbow/Hand WDL  (L) Arm/Elbow/Hand WDL  Abdomen WDL  Groin WDL  Scrotum/Coccyx/Buttocks WDL  (R) Leg WDL  (L) Leg WDL  (R) Heel/Foot/Toe WDL  (L) Heel/Foot/Toe WDL        Interventions In Place Pillows and Q2 Turns    Possible Skin Injury No    Pictures Uploaded Into Epic N/A  Wound Consult Placed N/A  RN Wound Prevention Protocol Ordered Yes

## 2024-09-07 NOTE — ASSESSMENT & PLAN NOTE
US with cholelithiasis with gallbladder wall thickening, sonographically compatible with cholecystitis and hepatomegaly  9/7 MRCP complete  Lap saturnino when acute gallstone pancreatitis resolved  9/9  lap saturnino  ACS Blue

## 2024-09-07 NOTE — PROGRESS NOTES
Hospital Medicine Daily Progress Note    Date of Service  9/7/2024    Chief Complaint  Gena Rosas is a 64 y.o. female admitted 9/5/2024 with N/V/RUQ abdominal pain x 3 days.    Hospital Course  Gena Rosas is a 64 y.o. female past medical history of type 2 diabetes, hypothyroidism, hypertension, who presented 9/5/2024 with complaints of right upper quadrant pain, nausea and vomiting for 3 days.  Upon evaluation with ultrasound of right upper quadrant, she found to have cholelithiasis and gallbladder wall thickening consistent with cholecystitis.  There is no CBD dilation.  Blood work showed lipase greater than 3000, , .  Dr. Saini from general surgery consulted and recommended supportive care and treatment for pancreatitis for now, with delayed cholecystectomy next 2 days.  If bilirubin goes up, may need to consult GI for ERCP    Interval Problem Update  9/6: no longer with emesis, tolerating CLD. MRCP ordered to rule out choledocholithiasis.  Son at bedside.    9/7:  seen patient with GI, consulted for ductal stone finding on MRCP.  Patient aware and agreeable to ERCP in a.m. followed by meghan matamoros in 1-2 days.    I have discussed this patient's plan of care and discharge plan at IDT rounds today with Case Management, Nursing, Nursing leadership, and other members of the IDT team.    Consultants/Specialty  general surgery    Code Status  Full Code    Disposition  Medically Cleared  I have placed the appropriate orders for post-discharge needs.    Review of Systems  Review of Systems   Constitutional:  Negative for chills, diaphoresis, fever and malaise/fatigue.   HENT:  Negative for congestion and sore throat.    Eyes:  Negative for pain and discharge.   Respiratory:  Negative for cough, hemoptysis, sputum production, shortness of breath and wheezing.    Cardiovascular:  Negative for chest pain, palpitations, claudication and leg swelling.   Gastrointestinal:   Positive for abdominal pain, nausea and vomiting. Negative for constipation, diarrhea and melena.   Genitourinary:  Negative for dysuria, frequency and urgency.   Musculoskeletal:  Positive for back pain. Negative for joint pain, myalgias and neck pain.   Skin:  Negative for itching and rash.   Neurological:  Negative for dizziness, sensory change, speech change, focal weakness, loss of consciousness, weakness and headaches.   Endo/Heme/Allergies:  Does not bruise/bleed easily.   Psychiatric/Behavioral:  Negative for depression, substance abuse and suicidal ideas.         Physical Exam  Temp:  [36 °C (96.8 °F)-36.8 °C (98.2 °F)] 36.3 °C (97.4 °F)  Pulse:  [55-60] 56  Resp:  [18-19] 19  BP: (116-141)/(58-71) 141/71  SpO2:  [94 %-96 %] 94 %    Physical Exam  Vitals and nursing note reviewed.   Constitutional:       General: She is not in acute distress.     Appearance: She is well-developed. She is obese. She is not diaphoretic.   HENT:      Head: Normocephalic and atraumatic.      Nose: Nose normal.      Mouth/Throat:      Pharynx: No oropharyngeal exudate.   Eyes:      General: No scleral icterus.        Right eye: No discharge.         Left eye: No discharge.      Conjunctiva/sclera: Conjunctivae normal.      Pupils: Pupils are equal, round, and reactive to light.   Neck:      Thyroid: No thyromegaly.      Vascular: No JVD.      Trachea: No tracheal deviation.   Cardiovascular:      Rate and Rhythm: Normal rate and regular rhythm.      Heart sounds: Normal heart sounds. No murmur heard.     No friction rub. No gallop.   Pulmonary:      Effort: Pulmonary effort is normal. No respiratory distress.      Breath sounds: Normal breath sounds. No stridor. No wheezing or rales.   Chest:      Chest wall: No tenderness.   Abdominal:      General: Bowel sounds are normal. There is no distension.      Palpations: Abdomen is soft. There is no mass.      Tenderness: There is abdominal tenderness (ruq). There is no guarding or  rebound.   Musculoskeletal:         General: No tenderness. Normal range of motion.      Cervical back: Normal range of motion and neck supple.   Lymphadenopathy:      Cervical: No cervical adenopathy.   Skin:     General: Skin is warm and dry.      Capillary Refill: Capillary refill takes less than 2 seconds.      Findings: No erythema or rash.   Neurological:      General: No focal deficit present.      Mental Status: She is alert and oriented to person, place, and time.      Cranial Nerves: No cranial nerve deficit.      Motor: No abnormal muscle tone.      Coordination: Coordination normal.   Psychiatric:         Mood and Affect: Mood normal.         Behavior: Behavior normal.         Thought Content: Thought content normal.         Judgment: Judgment normal.         Fluids    Intake/Output Summary (Last 24 hours) at 9/7/2024 1657  Last data filed at 9/7/2024 0600  Gross per 24 hour   Intake 1774.55 ml   Output --   Net 1774.55 ml        Laboratory  Recent Labs     09/05/24 2153 09/06/24 0337 09/07/24  0013   WBC 10.8 7.9 8.7   RBC 4.47 3.77* 3.72*   HEMOGLOBIN 13.7 11.7* 11.4*   HEMATOCRIT 40.8 34.6* 34.6*   MCV 91.3 91.8 93.0   MCH 30.6 31.0 30.6   MCHC 33.6 33.8 32.9   RDW 44.7 45.5 46.5   PLATELETCT 258 231 222   MPV 10.0 10.4 10.3     Recent Labs     09/05/24 2153 09/06/24 0337 09/07/24  0013   SODIUM 135 141 142   POTASSIUM 3.4* 3.9 3.9   CHLORIDE 98 108 107   CO2 23 23 22   GLUCOSE 144* 127* 99   BUN 12 11 7*   CREATININE 0.65 0.67 0.57   CALCIUM 10.2 8.5 8.9                   Imaging  [id number]/O   Final Result      1.  Cholelithiasis.   2.  No choledocholithiasis or significant biliary dilatation.      US-RUQ   Final Result         1.  Cholelithiasis with gallbladder wall thickening, sonographically compatible with cholecystitis.   2.  Hepatomegaly   3.  Echogenic liver, compatible with fatty change versus fibrosis.   4.  Mild dilatation right renal pelvis compatible with extrarenal pelvis  morphology or mild hydronephrosis.           Assessment/Plan  * Choledocholithiasis with acute cholecystitis with obstruction- (present on admission)  Assessment & Plan  64-year-old female presented with right upper quadrant pain nausea vomiting for 3 days  Right upper quadrant ultrasound: Cholelithiasis, gallbladder wall thickening  Plan for definitive treatment as per surgery after pancreatitis cools down  No antibiotics for now since WBC is not elevated  Symptomatic treatment with Zofran, morphine as needed  IV rehydration, Pepcid  9/7: MRCP with multiple stones seen in gallbladder and occult ductal stone.  GI consulted who agree patient needs ERCP in a.m. to remove ductal stone first then can proceed with lap cholecystectomy in 1 to 2 days.  Clear liquid diet for now.  N.p.o. at midnight.  Will continue NS at 75 an hour.    Hypertension- (present on admission)  Assessment & Plan  Will hold lisinopril due to dehydration.  Monitor blood pressure, restart if necessary    Dyslipidemia- (present on admission)  Assessment & Plan  Will hold Lipitor due to elevated LFTs    Hypokalemia- (present on admission)  Assessment & Plan  Potassium 3.4.  IV saline with potassium 20    Type 2 diabetes mellitus (HCC)- (present on admission)  Assessment & Plan  Controlled with hyperglycemia  Will hold metformin and order insulin sliding scale    Gallstone pancreatitis- (present on admission)  Assessment & Plan  Continue IV hydration  Zofran as needed  Clear liquid diet as tolerated  ERCP to remove ductal stone.  Cholecystectomy is planned in the next 2 days         VTE prophylaxis: VTE Selection    I have performed a physical exam and reviewed and updated ROS and Plan today (9/7/2024). In review of yesterday's note (9/6/2024), there are no changes except as documented above.

## 2024-09-07 NOTE — CONSULTS
..GASTROENTEROLOGY CONSULTATION    PATIENT NAME: Gena Rosas  : 1960  CSN: [id number]  MRN:  [id number]     CONSULTATION DATE:  2024    PRIMARY CARE PROVIDER:  Pcp Pt States None      REASON FOR CONSULT:  Choledocholithiasis    CONSULT REQUESTED BY:  Dr. Anabel Kenny    HISTORY OF PRESENT ILLNESS:  Gena Rosas is a 64 y.o. female who presented to the ED on 2024 with epigastric and right upper quadrant abdominal pain for 2 to 3 days.  Workup in the ED revealed cholelithiasis and gallbladder wall thickening, no CBD dilation on right upper quadrant ultrasound.  Pertinent labs include lipase greater than 3000, , .  She was evaluated by surgery who recommended resolution of pancreatitis prior to surgical intervention.      MRCP resulted today with choledocholithiasis.  Lipase now 91 with , , alk phos 120, T. bili 0.4.      Patient seen. Denies abdominal pain. Only complaint is constipation. Reviewed images with her and her family.     PAST MEDICAL HISTORY:  Past Medical History:   Diagnosis Date    Diabetes (HCC)     Hypertension 2024    Thyroid disease        PAST SURGICAL HISTORY:  History reviewed. No pertinent surgical history.     CURRENT MEDS:  Current Facility-Administered Medications   Medication Dose Route Frequency Provider Last Rate Last Admin    Indomethacin (Indocin) suppository 100 mg  100 mg Rectal Once Gurpreet Teran M.D.        0.9 % NaCl with KCl 20 mEq infusion   Intravenous Continuous Anabel Kenny M.D. 125 mL/hr at 24 1300 New Bag at 24 1300    [Held by provider] enoxaparin (Lovenox) inj 40 mg  40 mg Subcutaneous DAILY AT 1800 Panda Oliva M.D.        Pharmacy Consult Request ...Pain Management Review 1 Each  1 Each Other PHARMACY TO DOSE Panda Oliva M.D.        oxyCODONE immediate-release (Roxicodone) tablet 5 mg  5 mg Oral Q3HRS PRN Panda Oliva M.D.        Or    oxyCODONE immediate release  (Roxicodone) tablet 10 mg  10 mg Oral Q3HRS PRN Panda Oliva M.D.        Or    morphine 4 MG/ML injection 4 mg  4 mg Intravenous Q3HRS PRN Panda Oliva M.D.        senna-docusate (Pericolace Or Senokot S) 8.6-50 MG per tablet 2 Tablet  2 Tablet Oral Q EVENING Panda Oliva M.D.   2 Tablet at 24 1715    And    polyethylene glycol/lytes (Miralax) Packet 1 Packet  1 Packet Oral QDAY PRN Panda Oliva M.D.        labetalol (Normodyne/Trandate) injection 10 mg  10 mg Intravenous Q4HRS PRN Panda Oliva M.D.        ondansetron (Zofran) syringe/vial injection 4 mg  4 mg Intravenous Q4HRS PRN Panda Oliva M.D.        ondansetron (Zofran ODT) dispertab 4 mg  4 mg Oral Q4HRS PRN Panda Oliva M.D.        promethazine (Phenergan) tablet 12.5-25 mg  12.5-25 mg Oral Q4HRS PRN Panda Oliva M.D.        promethazine (Phenergan) suppository 12.5-25 mg  12.5-25 mg Rectal Q4HRS PRN Panda Oliva M.D.        prochlorperazine (Compazine) injection 5-10 mg  5-10 mg Intravenous Q4HRS PRN Panda Oliva M.D.        levothyroxine (Synthroid) tablet 100 mcg  100 mcg Oral AM ES Panda Oliva M.D.   100 mcg at 24 0549    insulin regular (HumuLIN R,NovoLIN R) injection  1-6 Units Subcutaneous Q6HRS Panda Oliva M.D.   1 Units at 24 0557    And    dextrose 50% (D50W) injection 25 g  25 g Intravenous Q15 MIN PRN Panda Oliva M.D.            ALLERGIES:  Allergies   Allergen Reactions    Nkda [No Known Drug Allergy]        SOCIAL HISTORY:  Social History     Socioeconomic History    Marital status: Single     Spouse name: Not on file    Number of children: Not on file    Years of education: Not on file    Highest education level: Not on file   Occupational History    Not on file   Tobacco Use    Smoking status: Former     Current packs/day: 0.00     Types: Cigarettes     Quit date: 2013     Years since quittin.1    Smokeless tobacco: Never   Vaping Use    Vaping  "status: Never Used   Substance and Sexual Activity    Alcohol use: No    Drug use: No    Sexual activity: Not on file   Other Topics Concern    Not on file   Social History Narrative    Not on file     Social Determinants of Health     Financial Resource Strain: Not on file   Food Insecurity: No Food Insecurity (9/6/2024)    Hunger Vital Sign     Worried About Running Out of Food in the Last Year: Never true     Ran Out of Food in the Last Year: Never true   Transportation Needs: No Transportation Needs (9/6/2024)    PRAPARE - Transportation     Lack of Transportation (Medical): No     Lack of Transportation (Non-Medical): No   Physical Activity: Not on file   Stress: Not on file   Social Connections: Not on file   Intimate Partner Violence: Not At Risk (9/6/2024)    Humiliation, Afraid, Rape, and Kick questionnaire     Fear of Current or Ex-Partner: No     Emotionally Abused: No     Physically Abused: No     Sexually Abused: No   Housing Stability: Low Risk  (9/6/2024)    Housing Stability Vital Sign     Unable to Pay for Housing in the Last Year: No     Number of Times Moved in the Last Year: 1     Homeless in the Last Year: No       FAMILY HISTORY:  History reviewed. No pertinent family history.     REVIEW OF SYSTEMS:  General ROS: Negative for - chills, fever, night sweats or weight loss.  HEENT ROS: Negative  Respiratory ROS: Negative for - cough or shortness of breath.  Cardiovascular ROS:  Negative for - chest pain or palpitations.  Gastrointestinal ROS: As per the history of present illness.  Genito-Urinary ROS: Negative  Musculoskeletal ROS: Negative.  Neurological ROS: Negative  Skin ROS: negative  Hematology ROS: negative  Endocrinology ROS: Negative        PHYSICAL EXAM:  VITALS: /61   Pulse (!) 55   Temp 36 °C (96.8 °F) (Temporal)   Resp 18   Ht 1.651 m (5' 5\")   Wt 98.5 kg (217 lb 2.5 oz)   SpO2 96%   BMI 36.14 kg/m²   GEN:  Gena Rosas is a 64 y.o. female in no acute " "distress.  HEENT: Mucous membranes pink and moist.  Sclera anicteric.    NECK:    Neck supple without lymphadenopathy or thyromegaly.  LUNGS: Clear to auscultation posteriorly.  HEART: Regular rate and rhythm. S1 and S2 normal. No murmurs, gallops  ABD:  Soft, non-tender   RECTAL: Not done at this time.  EXT:  Without cyanosis, deformity or pitting edema.  SKIN:  Pink, warm, dry.  NEURO: Grossly intact, A/OR.    LABS:  Recent Labs     09/05/24 2153 09/06/24 0337 09/07/24  0013   WBC 10.8 7.9 8.7   MCV 91.3 91.8 93.0     Recent Labs     09/05/24 2153 09/06/24 0337 09/07/24  0013   GLUCOSE 144* 127* 99   BUN 12 11 7*   CO2 23 23 22     No results found for: \"INR\", \"PT\"  No components found for: \"ALT\", \"AST\", \"GGT\", \"ALKPHOS\"  No results found for: \"BILINEO\"      @LASTIMGCAT([id number])@     @LASTIMGCAT([id number])@       IMPRESSION/PLAN:  Impression:  Choledocholithiasis  Cholecystitis  Gallstone pancreatitis  Hypertension  Type 2 diabetes    Plan:  Patient has choledocholithiasis identified on her MRCP and requires ERCP for stone extraction  Pancreatitis improving  Clear liquid diet today  N.p.o. at midnight  ERCP tomorrow  Hold anticoagulation    Discussed with patient, Dr. Kenny, surgery, Dr. Parul Harper, DNP,  APRN  Gastroenterology      "

## 2024-09-07 NOTE — CARE PLAN
The patient is Stable - Low risk of patient condition declining or worsening    Shift Goals  Clinical Goals: IV fluids, safety  Patient Goals: rest  Family Goals: gregorio    Patient A&Ox4. Patient Declines pain upon assessment and reassessment. Bed in lowest position, threaded slippers on. All needs met at this time.     Progress made toward(s) clinical / shift goals:      Problem: Pain - Standard  Goal: Alleviation of pain or a reduction in pain to the patient’s comfort goal  Outcome: Progressing     Problem: Knowledge Deficit - Standard  Goal: Patient and family/care givers will demonstrate understanding of plan of care, disease process/condition, diagnostic tests and medications  Outcome: Progressing

## 2024-09-08 ENCOUNTER — APPOINTMENT (OUTPATIENT)
Dept: RADIOLOGY | Facility: MEDICAL CENTER | Age: 64
DRG: 417 | End: 2024-09-08
Attending: INTERNAL MEDICINE
Payer: COMMERCIAL

## 2024-09-08 PROBLEM — K80.00 CALCULUS OF GALLBLADDER WITH ACUTE CHOLECYSTITIS WITHOUT OBSTRUCTION: Status: ACTIVE | Noted: 2024-09-06

## 2024-09-08 LAB
ALBUMIN SERPL BCP-MCNC: 3.5 G/DL (ref 3.2–4.9)
ALBUMIN/GLOB SERPL: 1.2 G/DL
ALP SERPL-CCNC: 106 U/L (ref 30–99)
ALT SERPL-CCNC: 155 U/L (ref 2–50)
ANION GAP SERPL CALC-SCNC: 10 MMOL/L (ref 7–16)
AST SERPL-CCNC: 45 U/L (ref 12–45)
BILIRUB SERPL-MCNC: 0.4 MG/DL (ref 0.1–1.5)
BUN SERPL-MCNC: 6 MG/DL (ref 8–22)
CALCIUM ALBUM COR SERPL-MCNC: 9.1 MG/DL (ref 8.5–10.5)
CALCIUM SERPL-MCNC: 8.7 MG/DL (ref 8.5–10.5)
CHLORIDE SERPL-SCNC: 109 MMOL/L (ref 96–112)
CO2 SERPL-SCNC: 21 MMOL/L (ref 20–33)
CREAT SERPL-MCNC: 0.53 MG/DL (ref 0.5–1.4)
ERYTHROCYTE [DISTWIDTH] IN BLOOD BY AUTOMATED COUNT: 46.5 FL (ref 35.9–50)
GFR SERPLBLD CREATININE-BSD FMLA CKD-EPI: 104 ML/MIN/1.73 M 2
GLOBULIN SER CALC-MCNC: 3 G/DL (ref 1.9–3.5)
GLUCOSE BLD STRIP.AUTO-MCNC: 101 MG/DL (ref 65–99)
GLUCOSE BLD STRIP.AUTO-MCNC: 118 MG/DL (ref 65–99)
GLUCOSE BLD STRIP.AUTO-MCNC: 90 MG/DL (ref 65–99)
GLUCOSE BLD STRIP.AUTO-MCNC: 95 MG/DL (ref 65–99)
GLUCOSE SERPL-MCNC: 113 MG/DL (ref 65–99)
HCT VFR BLD AUTO: 35.1 % (ref 37–47)
HGB BLD-MCNC: 11.4 G/DL (ref 12–16)
LIPASE SERPL-CCNC: 28 U/L (ref 11–82)
MCH RBC QN AUTO: 30.6 PG (ref 27–33)
MCHC RBC AUTO-ENTMCNC: 32.5 G/DL (ref 32.2–35.5)
MCV RBC AUTO: 94.4 FL (ref 81.4–97.8)
PLATELET # BLD AUTO: 223 K/UL (ref 164–446)
PMV BLD AUTO: 10.5 FL (ref 9–12.9)
POTASSIUM SERPL-SCNC: 4 MMOL/L (ref 3.6–5.5)
PROT SERPL-MCNC: 6.5 G/DL (ref 6–8.2)
RBC # BLD AUTO: 3.72 M/UL (ref 4.2–5.4)
SODIUM SERPL-SCNC: 140 MMOL/L (ref 135–145)
WBC # BLD AUTO: 9.1 K/UL (ref 4.8–10.8)

## 2024-09-08 PROCEDURE — 700102 HCHG RX REV CODE 250 W/ 637 OVERRIDE(OP): Performed by: SURGERY

## 2024-09-08 PROCEDURE — 80053 COMPREHEN METABOLIC PANEL: CPT

## 2024-09-08 PROCEDURE — 700102 HCHG RX REV CODE 250 W/ 637 OVERRIDE(OP): Performed by: INTERNAL MEDICINE

## 2024-09-08 PROCEDURE — 99231 SBSQ HOSP IP/OBS SF/LOW 25: CPT | Performed by: NURSE PRACTITIONER

## 2024-09-08 PROCEDURE — 83690 ASSAY OF LIPASE: CPT

## 2024-09-08 PROCEDURE — 85027 COMPLETE CBC AUTOMATED: CPT

## 2024-09-08 PROCEDURE — 700105 HCHG RX REV CODE 258: Performed by: HOSPITALIST

## 2024-09-08 PROCEDURE — A9270 NON-COVERED ITEM OR SERVICE: HCPCS | Performed by: SURGERY

## 2024-09-08 PROCEDURE — 700111 HCHG RX REV CODE 636 W/ 250 OVERRIDE (IP): Mod: JZ | Performed by: NURSE PRACTITIONER

## 2024-09-08 PROCEDURE — 99233 SBSQ HOSP IP/OBS HIGH 50: CPT | Performed by: HOSPITALIST

## 2024-09-08 PROCEDURE — 82962 GLUCOSE BLOOD TEST: CPT | Mod: 91

## 2024-09-08 PROCEDURE — A9270 NON-COVERED ITEM OR SERVICE: HCPCS | Performed by: INTERNAL MEDICINE

## 2024-09-08 PROCEDURE — 770006 HCHG ROOM/CARE - MED/SURG/GYN SEMI*

## 2024-09-08 PROCEDURE — 36415 COLL VENOUS BLD VENIPUNCTURE: CPT

## 2024-09-08 PROCEDURE — 700101 HCHG RX REV CODE 250: Performed by: HOSPITALIST

## 2024-09-08 RX ORDER — DEXTROSE, SODIUM CHLORIDE, SODIUM LACTATE, POTASSIUM CHLORIDE, AND CALCIUM CHLORIDE 5; .6; .31; .03; .02 G/100ML; G/100ML; G/100ML; G/100ML; G/100ML
INJECTION, SOLUTION INTRAVENOUS CONTINUOUS
Status: DISCONTINUED | OUTPATIENT
Start: 2024-09-08 | End: 2024-09-09

## 2024-09-08 RX ADMIN — ENOXAPARIN SODIUM 40 MG: 100 INJECTION SUBCUTANEOUS at 17:20

## 2024-09-08 RX ADMIN — LEVOTHYROXINE SODIUM 100 MCG: 0.1 TABLET ORAL at 05:17

## 2024-09-08 RX ADMIN — SODIUM CHLORIDE, SODIUM LACTATE, POTASSIUM CHLORIDE, CALCIUM CHLORIDE AND DEXTROSE MONOHYDRATE: 5; 600; 310; 30; 20 INJECTION, SOLUTION INTRAVENOUS at 11:48

## 2024-09-08 RX ADMIN — SENNOSIDES AND DOCUSATE SODIUM 2 TABLET: 50; 8.6 TABLET ORAL at 17:20

## 2024-09-08 RX ADMIN — POTASSIUM CHLORIDE AND SODIUM CHLORIDE: 900; 150 INJECTION, SOLUTION INTRAVENOUS at 05:22

## 2024-09-08 RX ADMIN — CELECOXIB 200 MG: 100 CAPSULE ORAL at 05:18

## 2024-09-08 ASSESSMENT — ENCOUNTER SYMPTOMS
COUGH: 0
BACK PAIN: 1
VOMITING: 1
WHEEZING: 0
DEPRESSION: 0
EYE PAIN: 0
PALPITATIONS: 0
SHORTNESS OF BREATH: 0
LOSS OF CONSCIOUSNESS: 0
SORE THROAT: 0
CHILLS: 0
DIARRHEA: 0
SPUTUM PRODUCTION: 0
WEAKNESS: 0
NECK PAIN: 0
HEMOPTYSIS: 0
FEVER: 0
NAUSEA: 1
DIZZINESS: 0
BRUISES/BLEEDS EASILY: 0
SENSORY CHANGE: 0
CONSTIPATION: 0
HEADACHES: 0
ABDOMINAL PAIN: 1
MYALGIAS: 0
SPEECH CHANGE: 0
EYE DISCHARGE: 0
FOCAL WEAKNESS: 0
DIAPHORESIS: 0
CLAUDICATION: 0

## 2024-09-08 ASSESSMENT — PAIN DESCRIPTION - PAIN TYPE
TYPE: ACUTE PAIN
TYPE: ACUTE PAIN

## 2024-09-08 ASSESSMENT — LIFESTYLE VARIABLES: SUBSTANCE_ABUSE: 0

## 2024-09-08 NOTE — PROGRESS NOTES
Lfts continue to downtrend.  Given reassuring mrcp I recomnmend Cholecystectomy  Will cancel ercp  Call back as needed. Gi will sign off

## 2024-09-08 NOTE — CARE PLAN
Problem: Pain - Standard  Goal: Alleviation of pain or a reduction in pain to the patient’s comfort goal  9/8/2024 0958 by Glenda Hernandez R.N.  Outcome: Progressing  9/8/2024 0957 by SARA Goss.N.  Outcome: Progressing     Problem: Knowledge Deficit - Standard  Goal: Patient and family/care givers will demonstrate understanding of plan of care, disease process/condition, diagnostic tests and medications  9/8/2024 0958 by Glenda Hernandez R.N.  Outcome: Progressing  9/8/2024 0957 by Glenda Hernandez RNegritaN.  Outcome: Progressing     The patient is Stable - Low risk of patient condition declining or worsening    Shift Goals  Clinical Goals: NPO, safety  Patient Goals: rest and get better  Family Goals: EMILEE    Progress made toward(s) clinical / shift goals:  patient cooperative with cares.     Patient is not progressing towards the following goals:

## 2024-09-08 NOTE — PROGRESS NOTES
"  DATE: 9/8/2024    Hospital Day 4  gallstone pancreatitis .    INTERVAL EVENTS:  LFT trending down  Bili 0.4 =  Tentative lap saturnino tomorrow   Clears for now   NPO at midnight     REVIEW OF SYSTEMS:  Comprehensive review of systems is negative with the exception of the aforementioned HPI, PMH, and PSH bullets in accordance with CMS guidelines.    PHYSICAL EXAMINATION:  Vital Signs: /66   Pulse 68   Temp 36.2 °C (97.2 °F) (Temporal)   Resp 18   Ht 1.651 m (5' 5\")   Wt 98.5 kg (217 lb 2.5 oz)   SpO2 96%   Physical Exam  Vitals and nursing note reviewed. Exam conducted with a chaperone present (son).   Constitutional:       General: She is sleeping. She is not in acute distress.     Appearance: She is not ill-appearing.   HENT:      Mouth/Throat:      Mouth: Mucous membranes are moist.      Pharynx: Oropharynx is clear.   Pulmonary:      Effort: Pulmonary effort is normal. No respiratory distress.   Abdominal:      General: There is no distension.   Skin:     General: Skin is warm and dry.      Capillary Refill: Capillary refill takes less than 2 seconds.   Psychiatric:         Behavior: Behavior normal.         ASSESSMENT AND PLAN:  Gallstone pancreatitis- (present on admission)  Assessment & Plan  US with cholelithiasis with gallbladder wall thickening, sonographically compatible with cholecystitis and hepatomegaly  9/7 MRCP complete  Lap saturnino when acute gallstone pancreatitis resolved  ACS Blue     Tentative lap saturnino 9/9  NPO after midnight          ____________________________________     TORRES Gibson.    DD: 9/8/2024  2:41 PM     "

## 2024-09-08 NOTE — PROGRESS NOTES
Received report from NOC RN and assumed care of pt.  services used for assessment. Pt is A&Ox4 resting in bed on RA. Pt reports 0/10  pain. POC discussed with pt, including NPO status pending procedure; all questions answered. Pt ambulates up to bathroom with no assistance, steady gait. No other needs at this time. Bed locked in lowest position, call light within reach, pt educated to call for assistance.

## 2024-09-08 NOTE — PROGRESS NOTES
Hospital Medicine Daily Progress Note    Date of Service  9/8/2024    Chief Complaint  Gena Rosas is a 64 y.o. female admitted 9/5/2024 with N/V/RUQ abdominal pain x 3 days.    Hospital Course  Gena Rosas is a 64 y.o. female past medical history of type 2 diabetes, hypothyroidism, hypertension, who presented 9/5/2024 with complaints of right upper quadrant pain, nausea and vomiting for 3 days.  Upon evaluation with ultrasound of right upper quadrant, she found to have cholelithiasis and gallbladder wall thickening consistent with cholecystitis.  There is no CBD dilation.  Blood work showed lipase greater than 3000, , .  Dr. Saini from general surgery consulted and recommended supportive care and treatment for pancreatitis for now, with delayed cholecystectomy next 2 days.  If bilirubin goes up, may need to consult GI for ERCP    Interval Problem Update  9/6: no longer with emesis, tolerating CLD. MRCP ordered to rule out choledocholithiasis.  Son at bedside.    9/7:  seen patient with GI, consulted for ductal stone finding on MRCP.  Patient aware and agreeable to ERCP in a.m. followed by lap saturnino in 1-2 days.  9/8: MRCP read by radiology as no ductal stones.  Patient to remain n.p.o. for lap cholecystectomy.  Blood sugar low 90 from being n.p.o.  I did start D5 LR at 50 an hour.  Patient has no pain currently in abdomen.  Son at bedside given update.  Lipase 28 .    I have discussed this patient's plan of care and discharge plan at IDT rounds today with Case Management, Nursing, Nursing leadership, and other members of the IDT team.    Consultants/Specialty  general surgery    Code Status  Full Code    Disposition  The patient is not medically cleared for discharge to home or a post-acute facility.  Anticipate discharge to: home with close outpatient follow-up    I have placed the appropriate orders for post-discharge needs.    Review of Systems  Review of  Systems   Constitutional:  Negative for chills, diaphoresis, fever and malaise/fatigue.   HENT:  Negative for congestion and sore throat.    Eyes:  Negative for pain and discharge.   Respiratory:  Negative for cough, hemoptysis, sputum production, shortness of breath and wheezing.    Cardiovascular:  Negative for chest pain, palpitations, claudication and leg swelling.   Gastrointestinal:  Positive for abdominal pain, nausea and vomiting. Negative for constipation, diarrhea and melena.   Genitourinary:  Negative for dysuria, frequency and urgency.   Musculoskeletal:  Positive for back pain. Negative for joint pain, myalgias and neck pain.   Skin:  Negative for itching and rash.   Neurological:  Negative for dizziness, sensory change, speech change, focal weakness, loss of consciousness, weakness and headaches.   Endo/Heme/Allergies:  Does not bruise/bleed easily.   Psychiatric/Behavioral:  Negative for depression, substance abuse and suicidal ideas.         Physical Exam  Temp:  [36.1 °C (96.9 °F)-36.7 °C (98 °F)] 36.2 °C (97.2 °F)  Pulse:  [56-68] 68  Resp:  [18-20] 18  BP: (125-141)/(62-74) 136/66  SpO2:  [94 %-97 %] 96 %    Physical Exam  Vitals and nursing note reviewed.   Constitutional:       General: She is not in acute distress.     Appearance: She is well-developed. She is obese. She is not diaphoretic.   HENT:      Head: Normocephalic and atraumatic.      Nose: Nose normal.      Mouth/Throat:      Pharynx: No oropharyngeal exudate.   Eyes:      General: No scleral icterus.        Right eye: No discharge.         Left eye: No discharge.      Conjunctiva/sclera: Conjunctivae normal.      Pupils: Pupils are equal, round, and reactive to light.   Neck:      Thyroid: No thyromegaly.      Vascular: No JVD.      Trachea: No tracheal deviation.   Cardiovascular:      Rate and Rhythm: Normal rate and regular rhythm.      Heart sounds: Normal heart sounds. No murmur heard.     No friction rub. No gallop.    Pulmonary:      Effort: Pulmonary effort is normal. No respiratory distress.      Breath sounds: Normal breath sounds. No stridor. No wheezing or rales.   Chest:      Chest wall: No tenderness.   Abdominal:      General: Bowel sounds are normal. There is no distension.      Palpations: Abdomen is soft. There is no mass.      Tenderness: There is abdominal tenderness (ruq). There is no guarding or rebound.   Musculoskeletal:         General: No tenderness. Normal range of motion.      Cervical back: Normal range of motion and neck supple.   Lymphadenopathy:      Cervical: No cervical adenopathy.   Skin:     General: Skin is warm and dry.      Capillary Refill: Capillary refill takes less than 2 seconds.      Findings: No erythema or rash.   Neurological:      General: No focal deficit present.      Mental Status: She is alert and oriented to person, place, and time.      Cranial Nerves: No cranial nerve deficit.      Motor: No abnormal muscle tone.      Coordination: Coordination normal.   Psychiatric:         Mood and Affect: Mood normal.         Behavior: Behavior normal.         Thought Content: Thought content normal.         Judgment: Judgment normal.         Fluids  No intake or output data in the 24 hours ending 09/08/24 1428       Laboratory  Recent Labs     09/06/24 0337 09/07/24  0013 09/08/24  0135   WBC 7.9 8.7 9.1   RBC 3.77* 3.72* 3.72*   HEMOGLOBIN 11.7* 11.4* 11.4*   HEMATOCRIT 34.6* 34.6* 35.1*   MCV 91.8 93.0 94.4   MCH 31.0 30.6 30.6   MCHC 33.8 32.9 32.5   RDW 45.5 46.5 46.5   PLATELETCT 231 222 223   MPV 10.4 10.3 10.5     Recent Labs     09/06/24 0337 09/07/24  0013 09/08/24  0135   SODIUM 141 142 140   POTASSIUM 3.9 3.9 4.0   CHLORIDE 108 107 109   CO2 23 22 21   GLUCOSE 127* 99 113*   BUN 11 7* 6*   CREATININE 0.67 0.57 0.53   CALCIUM 8.5 8.9 8.7                   Imaging  [id number]   Final Result      1.  Cholelithiasis.   2.  No choledocholithiasis or significant biliary dilatation.       US-RUQ   Final Result         1.  Cholelithiasis with gallbladder wall thickening, sonographically compatible with cholecystitis.   2.  Hepatomegaly   3.  Echogenic liver, compatible with fatty change versus fibrosis.   4.  Mild dilatation right renal pelvis compatible with extrarenal pelvis morphology or mild hydronephrosis.           Assessment/Plan  * Calculus of gallbladder with acute cholecystitis without obstruction- (present on admission)  Assessment & Plan  64-year-old female presented with right upper quadrant pain nausea vomiting for 3 days  Right upper quadrant ultrasound: Cholelithiasis, gallbladder wall thickening  Plan for definitive treatment as per surgery after pancreatitis cools down  No antibiotics for now since WBC is not elevated  Symptomatic treatment with Zofran, morphine as needed  IV rehydration, Pepcid  9/7: MRCP with multiple stones seen in gallbladder.   No ductal stones per rad read.  9/8:  npo for lap saturnino.    Hypertension- (present on admission)  Assessment & Plan  Will hold lisinopril due to dehydration.  Monitor blood pressure, restart if necessary    Dyslipidemia- (present on admission)  Assessment & Plan  Will hold Lipitor due to elevated LFTs    Hypokalemia- (present on admission)  Assessment & Plan  Potassium 3.4.  IV saline with potassium 20    Type 2 diabetes mellitus (HCC)- (present on admission)  Assessment & Plan  Controlled with hyperglycemia  Will hold metformin and order insulin sliding scale    Gallstone pancreatitis- (present on admission)  Assessment & Plan  Continue IV hydration  Zofran as needed  Clear liquid diet as tolerated  ERCP to remove ductal stone.  Cholecystectomy is planned in the next 2 days         VTE prophylaxis:   SCDs/TEDs      I have performed a physical exam and reviewed and updated ROS and Plan today (9/8/2024). In review of yesterday's note (9/7/2024), there are no changes except as documented above.

## 2024-09-09 ENCOUNTER — ANESTHESIA EVENT (OUTPATIENT)
Dept: SURGERY | Facility: MEDICAL CENTER | Age: 64
DRG: 417 | End: 2024-09-09
Payer: COMMERCIAL

## 2024-09-09 ENCOUNTER — ANESTHESIA (OUTPATIENT)
Dept: SURGERY | Facility: MEDICAL CENTER | Age: 64
DRG: 417 | End: 2024-09-09
Payer: COMMERCIAL

## 2024-09-09 LAB
GLUCOSE BLD STRIP.AUTO-MCNC: 100 MG/DL (ref 65–99)
GLUCOSE BLD STRIP.AUTO-MCNC: 104 MG/DL (ref 65–99)
GLUCOSE BLD STRIP.AUTO-MCNC: 110 MG/DL (ref 65–99)
GLUCOSE BLD STRIP.AUTO-MCNC: 183 MG/DL (ref 65–99)
PATHOLOGY CONSULT NOTE: NORMAL

## 2024-09-09 PROCEDURE — 160029 HCHG SURGERY MINUTES - 1ST 30 MINS LEVEL 4: Performed by: SURGERY

## 2024-09-09 PROCEDURE — 770006 HCHG ROOM/CARE - MED/SURG/GYN SEMI*

## 2024-09-09 PROCEDURE — A9270 NON-COVERED ITEM OR SERVICE: HCPCS | Performed by: INTERNAL MEDICINE

## 2024-09-09 PROCEDURE — 700111 HCHG RX REV CODE 636 W/ 250 OVERRIDE (IP): Mod: JZ | Performed by: HOSPITALIST

## 2024-09-09 PROCEDURE — 88304 TISSUE EXAM BY PATHOLOGIST: CPT

## 2024-09-09 PROCEDURE — 47562 LAPAROSCOPIC CHOLECYSTECTOMY: CPT | Mod: AS | Performed by: NURSE PRACTITIONER

## 2024-09-09 PROCEDURE — 700111 HCHG RX REV CODE 636 W/ 250 OVERRIDE (IP): Mod: JZ | Performed by: STUDENT IN AN ORGANIZED HEALTH CARE EDUCATION/TRAINING PROGRAM

## 2024-09-09 PROCEDURE — 700102 HCHG RX REV CODE 250 W/ 637 OVERRIDE(OP): Performed by: SURGERY

## 2024-09-09 PROCEDURE — 99232 SBSQ HOSP IP/OBS MODERATE 35: CPT | Mod: 57 | Performed by: SURGERY

## 2024-09-09 PROCEDURE — 700105 HCHG RX REV CODE 258: Performed by: STUDENT IN AN ORGANIZED HEALTH CARE EDUCATION/TRAINING PROGRAM

## 2024-09-09 PROCEDURE — 160035 HCHG PACU - 1ST 60 MINS PHASE I: Performed by: SURGERY

## 2024-09-09 PROCEDURE — 160009 HCHG ANES TIME/MIN: Performed by: SURGERY

## 2024-09-09 PROCEDURE — A9270 NON-COVERED ITEM OR SERVICE: HCPCS | Performed by: STUDENT IN AN ORGANIZED HEALTH CARE EDUCATION/TRAINING PROGRAM

## 2024-09-09 PROCEDURE — 99232 SBSQ HOSP IP/OBS MODERATE 35: CPT | Performed by: HOSPITALIST

## 2024-09-09 PROCEDURE — A9270 NON-COVERED ITEM OR SERVICE: HCPCS | Performed by: SURGERY

## 2024-09-09 PROCEDURE — 700111 HCHG RX REV CODE 636 W/ 250 OVERRIDE (IP): Mod: JZ | Performed by: SURGERY

## 2024-09-09 PROCEDURE — 160041 HCHG SURGERY MINUTES - EA ADDL 1 MIN LEVEL 4: Performed by: SURGERY

## 2024-09-09 PROCEDURE — 700101 HCHG RX REV CODE 250: Performed by: STUDENT IN AN ORGANIZED HEALTH CARE EDUCATION/TRAINING PROGRAM

## 2024-09-09 PROCEDURE — 700102 HCHG RX REV CODE 250 W/ 637 OVERRIDE(OP): Performed by: STUDENT IN AN ORGANIZED HEALTH CARE EDUCATION/TRAINING PROGRAM

## 2024-09-09 PROCEDURE — 82962 GLUCOSE BLOOD TEST: CPT

## 2024-09-09 PROCEDURE — 160036 HCHG PACU - EA ADDL 30 MINS PHASE I: Performed by: SURGERY

## 2024-09-09 PROCEDURE — 160002 HCHG RECOVERY MINUTES (STAT): Performed by: SURGERY

## 2024-09-09 PROCEDURE — 700102 HCHG RX REV CODE 250 W/ 637 OVERRIDE(OP): Performed by: INTERNAL MEDICINE

## 2024-09-09 PROCEDURE — 47562 LAPAROSCOPIC CHOLECYSTECTOMY: CPT | Performed by: SURGERY

## 2024-09-09 PROCEDURE — 0FT44ZZ RESECTION OF GALLBLADDER, PERCUTANEOUS ENDOSCOPIC APPROACH: ICD-10-PCS | Performed by: SURGERY

## 2024-09-09 PROCEDURE — 160048 HCHG OR STATISTICAL LEVEL 1-5: Performed by: SURGERY

## 2024-09-09 RX ORDER — LIDOCAINE HYDROCHLORIDE 40 MG/ML
SOLUTION TOPICAL PRN
Status: DISCONTINUED | OUTPATIENT
Start: 2024-09-09 | End: 2024-09-09 | Stop reason: SURG

## 2024-09-09 RX ORDER — HYDRALAZINE HYDROCHLORIDE 20 MG/ML
5 INJECTION INTRAMUSCULAR; INTRAVENOUS
Status: DISCONTINUED | OUTPATIENT
Start: 2024-09-09 | End: 2024-09-09 | Stop reason: HOSPADM

## 2024-09-09 RX ORDER — OXYCODONE HCL 5 MG/5 ML
5 SOLUTION, ORAL ORAL
Status: COMPLETED | OUTPATIENT
Start: 2024-09-09 | End: 2024-09-09

## 2024-09-09 RX ORDER — MAGNESIUM SULFATE HEPTAHYDRATE 40 MG/ML
INJECTION, SOLUTION INTRAVENOUS PRN
Status: DISCONTINUED | OUTPATIENT
Start: 2024-09-09 | End: 2024-09-09 | Stop reason: SURG

## 2024-09-09 RX ORDER — ONDANSETRON 2 MG/ML
INJECTION INTRAMUSCULAR; INTRAVENOUS PRN
Status: DISCONTINUED | OUTPATIENT
Start: 2024-09-09 | End: 2024-09-09 | Stop reason: SURG

## 2024-09-09 RX ORDER — HALOPERIDOL 5 MG/ML
1 INJECTION INTRAMUSCULAR
Status: DISCONTINUED | OUTPATIENT
Start: 2024-09-09 | End: 2024-09-09 | Stop reason: HOSPADM

## 2024-09-09 RX ORDER — DIPHENHYDRAMINE HYDROCHLORIDE 50 MG/ML
12.5 INJECTION INTRAMUSCULAR; INTRAVENOUS
Status: DISCONTINUED | OUTPATIENT
Start: 2024-09-09 | End: 2024-09-09 | Stop reason: HOSPADM

## 2024-09-09 RX ORDER — ALBUTEROL SULFATE 5 MG/ML
2.5 SOLUTION RESPIRATORY (INHALATION)
Status: DISCONTINUED | OUTPATIENT
Start: 2024-09-09 | End: 2024-09-09 | Stop reason: HOSPADM

## 2024-09-09 RX ORDER — DEXAMETHASONE SODIUM PHOSPHATE 4 MG/ML
INJECTION, SOLUTION INTRA-ARTICULAR; INTRALESIONAL; INTRAMUSCULAR; INTRAVENOUS; SOFT TISSUE PRN
Status: DISCONTINUED | OUTPATIENT
Start: 2024-09-09 | End: 2024-09-09 | Stop reason: SURG

## 2024-09-09 RX ORDER — KETOROLAC TROMETHAMINE 15 MG/ML
15 INJECTION, SOLUTION INTRAMUSCULAR; INTRAVENOUS EVERY 6 HOURS
Status: DISCONTINUED | OUTPATIENT
Start: 2024-09-09 | End: 2024-09-10 | Stop reason: HOSPADM

## 2024-09-09 RX ORDER — OXYCODONE HCL 5 MG/5 ML
10 SOLUTION, ORAL ORAL
Status: COMPLETED | OUTPATIENT
Start: 2024-09-09 | End: 2024-09-09

## 2024-09-09 RX ORDER — HYDROMORPHONE HYDROCHLORIDE 1 MG/ML
0.1 INJECTION, SOLUTION INTRAMUSCULAR; INTRAVENOUS; SUBCUTANEOUS
Status: DISCONTINUED | OUTPATIENT
Start: 2024-09-09 | End: 2024-09-09 | Stop reason: HOSPADM

## 2024-09-09 RX ORDER — CEFAZOLIN SODIUM 1 G/3ML
INJECTION, POWDER, FOR SOLUTION INTRAMUSCULAR; INTRAVENOUS PRN
Status: DISCONTINUED | OUTPATIENT
Start: 2024-09-09 | End: 2024-09-09 | Stop reason: SURG

## 2024-09-09 RX ORDER — HYDROMORPHONE HYDROCHLORIDE 1 MG/ML
0.2 INJECTION, SOLUTION INTRAMUSCULAR; INTRAVENOUS; SUBCUTANEOUS
Status: DISCONTINUED | OUTPATIENT
Start: 2024-09-09 | End: 2024-09-09 | Stop reason: HOSPADM

## 2024-09-09 RX ORDER — EPHEDRINE SULFATE 50 MG/ML
INJECTION, SOLUTION INTRAVENOUS PRN
Status: DISCONTINUED | OUTPATIENT
Start: 2024-09-09 | End: 2024-09-09 | Stop reason: SURG

## 2024-09-09 RX ORDER — EPHEDRINE SULFATE 50 MG/ML
5 INJECTION, SOLUTION INTRAVENOUS
Status: DISCONTINUED | OUTPATIENT
Start: 2024-09-09 | End: 2024-09-09 | Stop reason: HOSPADM

## 2024-09-09 RX ORDER — BUPIVACAINE HYDROCHLORIDE 2.5 MG/ML
INJECTION, SOLUTION EPIDURAL; INFILTRATION; INTRACAUDAL
Status: DISCONTINUED | OUTPATIENT
Start: 2024-09-09 | End: 2024-09-09 | Stop reason: HOSPADM

## 2024-09-09 RX ORDER — HYDROMORPHONE HYDROCHLORIDE 1 MG/ML
0.4 INJECTION, SOLUTION INTRAMUSCULAR; INTRAVENOUS; SUBCUTANEOUS
Status: DISCONTINUED | OUTPATIENT
Start: 2024-09-09 | End: 2024-09-09 | Stop reason: HOSPADM

## 2024-09-09 RX ORDER — KETOROLAC TROMETHAMINE 15 MG/ML
INJECTION, SOLUTION INTRAMUSCULAR; INTRAVENOUS PRN
Status: DISCONTINUED | OUTPATIENT
Start: 2024-09-09 | End: 2024-09-09 | Stop reason: SURG

## 2024-09-09 RX ORDER — SODIUM CHLORIDE, SODIUM LACTATE, POTASSIUM CHLORIDE, CALCIUM CHLORIDE 600; 310; 30; 20 MG/100ML; MG/100ML; MG/100ML; MG/100ML
INJECTION, SOLUTION INTRAVENOUS CONTINUOUS
Status: DISCONTINUED | OUTPATIENT
Start: 2024-09-09 | End: 2024-09-09 | Stop reason: HOSPADM

## 2024-09-09 RX ORDER — ONDANSETRON 2 MG/ML
4 INJECTION INTRAMUSCULAR; INTRAVENOUS
Status: DISCONTINUED | OUTPATIENT
Start: 2024-09-09 | End: 2024-09-09 | Stop reason: HOSPADM

## 2024-09-09 RX ORDER — SODIUM CHLORIDE, SODIUM LACTATE, POTASSIUM CHLORIDE, CALCIUM CHLORIDE 600; 310; 30; 20 MG/100ML; MG/100ML; MG/100ML; MG/100ML
INJECTION, SOLUTION INTRAVENOUS
Status: DISCONTINUED | OUTPATIENT
Start: 2024-09-09 | End: 2024-09-09 | Stop reason: SURG

## 2024-09-09 RX ORDER — ROCURONIUM BROMIDE 10 MG/ML
INJECTION, SOLUTION INTRAVENOUS PRN
Status: DISCONTINUED | OUTPATIENT
Start: 2024-09-09 | End: 2024-09-09 | Stop reason: SURG

## 2024-09-09 RX ADMIN — SODIUM CHLORIDE, POTASSIUM CHLORIDE, SODIUM LACTATE AND CALCIUM CHLORIDE: 600; 310; 30; 20 INJECTION, SOLUTION INTRAVENOUS at 10:37

## 2024-09-09 RX ADMIN — ONDANSETRON 4 MG: 2 INJECTION INTRAMUSCULAR; INTRAVENOUS at 11:16

## 2024-09-09 RX ADMIN — SUGAMMADEX 200 MG: 100 INJECTION, SOLUTION INTRAVENOUS at 11:18

## 2024-09-09 RX ADMIN — KETOROLAC TROMETHAMINE 15 MG: 15 INJECTION, SOLUTION INTRAMUSCULAR; INTRAVENOUS at 11:16

## 2024-09-09 RX ADMIN — SENNOSIDES AND DOCUSATE SODIUM 2 TABLET: 50; 8.6 TABLET ORAL at 16:57

## 2024-09-09 RX ADMIN — FENTANYL CITRATE 50 MCG: 50 INJECTION, SOLUTION INTRAMUSCULAR; INTRAVENOUS at 11:11

## 2024-09-09 RX ADMIN — FENTANYL CITRATE 50 MCG: 50 INJECTION, SOLUTION INTRAMUSCULAR; INTRAVENOUS at 10:58

## 2024-09-09 RX ADMIN — CELECOXIB 200 MG: 100 CAPSULE ORAL at 16:57

## 2024-09-09 RX ADMIN — KETOROLAC TROMETHAMINE 15 MG: 15 INJECTION, SOLUTION INTRAMUSCULAR; INTRAVENOUS at 17:32

## 2024-09-09 RX ADMIN — ACETAMINOPHEN 1000 MG: 500 TABLET ORAL at 16:57

## 2024-09-09 RX ADMIN — CEFAZOLIN 2 G: 1 INJECTION, POWDER, FOR SOLUTION INTRAMUSCULAR; INTRAVENOUS at 10:55

## 2024-09-09 RX ADMIN — MAGNESIUM SULFATE HEPTAHYDRATE 2 G: 2 INJECTION, SOLUTION INTRAVENOUS at 11:04

## 2024-09-09 RX ADMIN — ENOXAPARIN SODIUM 40 MG: 100 INJECTION SUBCUTANEOUS at 16:56

## 2024-09-09 RX ADMIN — INSULIN HUMAN 1 UNITS: 100 INJECTION, SOLUTION PARENTERAL at 17:28

## 2024-09-09 RX ADMIN — EPHEDRINE SULFATE 5 MG: 50 INJECTION, SOLUTION INTRAVENOUS at 10:59

## 2024-09-09 RX ADMIN — DEXAMETHASONE SODIUM PHOSPHATE 8 MG: 4 INJECTION INTRA-ARTICULAR; INTRALESIONAL; INTRAMUSCULAR; INTRAVENOUS; SOFT TISSUE at 10:55

## 2024-09-09 RX ADMIN — OXYCODONE HYDROCHLORIDE 10 MG: 5 SOLUTION ORAL at 11:43

## 2024-09-09 RX ADMIN — FENTANYL CITRATE 50 MCG: 50 INJECTION, SOLUTION INTRAMUSCULAR; INTRAVENOUS at 12:18

## 2024-09-09 RX ADMIN — LIDOCAINE HYDROCHLORIDE 4 ML: 40 SOLUTION TOPICAL at 10:44

## 2024-09-09 RX ADMIN — OXYCODONE HYDROCHLORIDE 10 MG: 10 TABLET ORAL at 21:54

## 2024-09-09 RX ADMIN — PROPOFOL 150 MG: 10 INJECTION, EMULSION INTRAVENOUS at 10:43

## 2024-09-09 RX ADMIN — KETOROLAC TROMETHAMINE 15 MG: 15 INJECTION, SOLUTION INTRAMUSCULAR; INTRAVENOUS at 13:31

## 2024-09-09 RX ADMIN — FENTANYL CITRATE 100 MCG: 50 INJECTION, SOLUTION INTRAMUSCULAR; INTRAVENOUS at 10:43

## 2024-09-09 RX ADMIN — ROCURONIUM BROMIDE 50 MG: 50 INJECTION, SOLUTION INTRAVENOUS at 10:44

## 2024-09-09 ASSESSMENT — ENCOUNTER SYMPTOMS
SHORTNESS OF BREATH: 0
BRUISES/BLEEDS EASILY: 0
SENSORY CHANGE: 0
VOMITING: 0
NAUSEA: 0
CLAUDICATION: 0
HEADACHES: 0
SPUTUM PRODUCTION: 0
CONSTIPATION: 0
BACK PAIN: 0
SPEECH CHANGE: 0
LOSS OF CONSCIOUSNESS: 0
DEPRESSION: 0
NECK PAIN: 0
CHILLS: 0
WHEEZING: 0
PALPITATIONS: 0
FOCAL WEAKNESS: 0
MYALGIAS: 0
WEAKNESS: 0
DIZZINESS: 0
EYE DISCHARGE: 0
DIARRHEA: 0
EYE PAIN: 0
HEMOPTYSIS: 0
SORE THROAT: 0
DIAPHORESIS: 0
ABDOMINAL PAIN: 1
FEVER: 0
COUGH: 0

## 2024-09-09 ASSESSMENT — PAIN DESCRIPTION - PAIN TYPE
TYPE: SURGICAL PAIN
TYPE: SURGICAL PAIN
TYPE: ACUTE PAIN
TYPE: SURGICAL PAIN
TYPE: SURGICAL PAIN
TYPE: ACUTE PAIN
TYPE: SURGICAL PAIN
TYPE: ACUTE PAIN
TYPE: ACUTE PAIN
TYPE: SURGICAL PAIN
TYPE: SURGICAL PAIN

## 2024-09-09 ASSESSMENT — LIFESTYLE VARIABLES: SUBSTANCE_ABUSE: 0

## 2024-09-09 ASSESSMENT — PAIN SCALES - GENERAL: PAIN_LEVEL: 2

## 2024-09-09 NOTE — CARE PLAN
The patient is Stable - Low risk of patient condition declining or worsening    Shift Goals  Clinical Goals: Pt will remain safe during shift. Patients pain will be reduced to pt's comfort level throughout shift.   Patient Goals: surgery  Family Goals: EMILEE    Progress made toward(s) clinical / shift goals:  Interventions: Assess pain frequently. Encourage pt to alert nursing staff to patient's pain. Administer pain medication per MAR.   Outcomes: Will continue to monitor throughout shift.     Patient is not progressing towards the following goals:

## 2024-09-09 NOTE — ANESTHESIA PREPROCEDURE EVALUATION
Case: [id number] Anesthesia Start Date/Time: 09/09/24 1037    Procedure: CHOLECYSTECTOMY, LAPAROSCOPIC    Pre-op diagnosis: Gallstone pancreatitis    Location: [place] [address] / SURGERY [facility]    Surgeons: Santa Acuna M.D.            Relevant Problems   CARDIAC   (positive) Hypertension      ENDO   (positive) Type 2 diabetes mellitus (HCC)       Physical Exam    Airway   Mallampati: II  TM distance: >3 FB  Neck ROM: full       Cardiovascular - normal exam  Rhythm: regular  Rate: normal  (-) murmur     Dental - normal exam           Pulmonary - normal exam  Breath sounds clear to auscultation     Abdominal   (+) obese     Neurological - normal exam         Other findings: Multiple missing teeth   No loose teeth               Anesthesia Plan    ASA 2       Plan - general       Airway plan will be ETT          Induction: intravenous    Postoperative Plan: Postoperative administration of opioids is intended.    Pertinent diagnostic labs and testing reviewed    Informed Consent:    Anesthetic plan and risks discussed with patient.    Use of blood products discussed with: patient whom consented to blood products.

## 2024-09-09 NOTE — OP REPORT
DATE OF SERVICE:  09/09/2024     PREOPERATIVE DIAGNOSES:  Gallstone, pancreatitis and cholelithiasis.     POSTOPERATIVE DIAGNOSES:  Gallstone, pancreatitis and cholelithiasis.     PROCEDURE:  Laparoscopic cholecystectomy.     SURGEON:  Santa Acuna MD     ASSISTANT:  CHUN Calloway     ANESTHESIA:  General endotracheal.     ANESTHESIOLOGIST:  Candace Rodriguez MD     INDICATIONS:  The patient is a 64-year-old female who presented with gallstone   and pancreatitis.  Her MRCP shows no choledocholithiasis.  She is being   brought to the operating room at this time for laparoscopic cholecystectomy. The indications for a surgical assistant in this surgery were indicated due to complexity of the procedure. Their role included aiding in incision, retraction, holding devices including camera for laparoscopic procedure, and closure of the wound.        FINDINGS:  A single duct and single artery found. Stones within gallbladder.     DESCRIPTION OF PROCEDURE:  After the patient was identified and consented, she   was brought to the operating room and placed in supine position.  The patient   underwent general endotracheal anesthetic clearance.  The patient's abdomen   was prepped and draped in sterile fashion.  The periumbilical area was   anesthetized with 0.25% Marcaine, 1 cm incision was made.  The abdominal wall   was lifted up, Veress needle was inserted into the abdominal cavity.  After   positive drop test, pneumoperitoneum was obtained, Veress needle was removed,   a 5 mm trocar was placed.  Under laparoscopic guidance, a 10 mm trocar was   placed in the epigastric position and two 5 mm trocar was placed in the right   subcostal position.  The gallbladder was lifted up to demonstrate the triangle   of Calot. The duct, artery and surrounding tissues were doubly clipped and   transected.  The gallbladder was incised from liver bed using electrocautery.    It was brought through the epigastric port.  Liver bed was  irrigated and   hemostasis obtained with electrocautery.  Port sites were visualized.  All   port sites were closed with 4-0 Vicryls.  Op-Site dressing placed on the   wounds.  The patient was extubated and taken to recovery room in stable   condition.  All sponge and needle counts were correct.        ______________________________  MD SARAH PALM/JB    DD:  09/09/2024 11:20  DT:  09/09/2024 11:52    Job#:  [id number]

## 2024-09-09 NOTE — PROGRESS NOTES
"  DATE: 9/9/2024    Hospital Day5  gall stone pancreatitis .    INTERVAL EVENTS:  Feels better. Labs improved. Plan lap saturnino today.    REVIEW OF SYSTEMS:  Comprehensive review of systems is negative with the exception of the aforementioned HPI, PMH, and PSH bullets in accordance with CMS guidelines.    PHYSICAL EXAMINATION:  Vital Signs: BP (!) 149/69   Pulse 66   Temp 36.2 °C (97.2 °F) (Temporal)   Resp 18   Ht 1.651 m (5' 5\")   Wt 98.5 kg (217 lb 2.5 oz)   SpO2 98%   Physical Exam  Cardiovascular:      Rate and Rhythm: Normal rate.   Pulmonary:      Effort: Pulmonary effort is normal.   Abdominal:      General: There is no distension.      Palpations: Abdomen is soft.      Tenderness: There is no abdominal tenderness.   Musculoskeletal:         General: Normal range of motion.   Skin:     General: Skin is warm.   Neurological:      General: No focal deficit present.      Mental Status: She is alert.         LABORATORY VALUES:  Recent Labs     09/07/24  0013 09/08/24  0135   WBC 8.7 9.1   RBC 3.72* 3.72*   HEMOGLOBIN 11.4* 11.4*   HEMATOCRIT 34.6* 35.1*   MCV 93.0 94.4   MCH 30.6 30.6   MCHC 32.9 32.5   RDW 46.5 46.5   PLATELETCT 222 223   MPV 10.3 10.5     Recent Labs     09/07/24  0013 09/08/24  0135   SODIUM 142 140   POTASSIUM 3.9 4.0   CHLORIDE 107 109   CO2 22 21   GLUCOSE 99 113*   BUN 7* 6*   CREATININE 0.57 0.53   CALCIUM 8.9 8.7     Recent Labs     09/07/24  0013 09/08/24  0135   ASTSGOT 109* 45   ALTSGPT 213* 155*   TBILIRUBIN 0.4 0.4   ALKPHOSPHAT 120* 106*   GLOBULIN 3.1 3.0           IMAGING:  [id number]   Final Result      1.  Cholelithiasis.   2.  No choledocholithiasis or significant biliary dilatation.      US-RUQ   Final Result         1.  Cholelithiasis with gallbladder wall thickening, sonographically compatible with cholecystitis.   2.  Hepatomegaly   3.  Echogenic liver, compatible with fatty change versus fibrosis.   4.  Mild dilatation right renal pelvis compatible with " extrarenal pelvis morphology or mild hydronephrosis.          ASSESSMENT AND PLAN:  Gallstone pancreatitis- (present on admission)  Assessment & Plan  US with cholelithiasis with gallbladder wall thickening, sonographically compatible with cholecystitis and hepatomegaly  9/7 MRCP complete  Lap saturnino when acute gallstone pancreatitis resolved  9/9 plan lap saturnino  ACS Blue              ____________________________________     Santa Acuna M.D.    DD: 9/9/2024  9:07 AM

## 2024-09-09 NOTE — ANESTHESIA POSTPROCEDURE EVALUATION
Patient: Gena Rosas    Procedure Summary       Date: 09/09/24 Room / Location: [place] [address] / SURGERY [facility]    Anesthesia Start: 1037 Anesthesia Stop: 1133    Procedure: CHOLECYSTECTOMY, LAPAROSCOPIC (Abdomen) Diagnosis: (Gallstone pancreatitis, Chronic cholecystitis)    Surgeons: Santa Acuna M.D. Responsible Provider: Candace Rodriguez M.D.    Anesthesia Type: general ASA Status: 2            Final Anesthesia Type: general  Last vitals  BP   Blood Pressure: (!) 189/88    Temp   36.3 °C (97.4 °F)    Pulse   62   Resp   18    SpO2   95 %      Anesthesia Post Evaluation    Patient location during evaluation: PACU  Patient participation: complete - patient participated  Level of consciousness: awake  Pain score: 2    Airway patency: patent  Anesthetic complications: no  Cardiovascular status: hemodynamically stable  Respiratory status: acceptable and face mask  Hydration status: euvolemic    PONV: none          There were no known notable events for this encounter.     Nurse Pain Score: 0 (NPRS)

## 2024-09-09 NOTE — PROGRESS NOTES
Pt. Picked up by transport to go to pre op area. Saline locked PIV. CHG bath done by Beatriz THOMPSON. Son accompanied pt. Wallet and phone are both with the son.

## 2024-09-09 NOTE — ANESTHESIA TIME REPORT
Anesthesia Start and Stop Event Times       Date Time Event    9/9/2024 1030 Ready for Procedure     1037 Anesthesia Start     1133 Anesthesia Stop          Responsible Staff  09/09/24      Name Role Begin End    Candace Rodriguez M.D. Anesth 1037 1133          Overtime Reason:  no overtime (within assigned shift)    Comments:

## 2024-09-09 NOTE — ANESTHESIA PROCEDURE NOTES
Airway    Date/Time: 9/9/2024 10:44 AM    Performed by: Candace Rodriguez M.D.  Authorized by: Candace Rodriguez M.D.    Location:  OR  Urgency:  Elective  Indications for Airway Management:  Anesthesia      Spontaneous Ventilation: absent    Sedation Level:  Deep  Preoxygenated: Yes    Patient Position:  Sniffing  Mask Difficulty Assessment:  1 - vent by mask  Final Airway Type:  Endotracheal airway  Final Endotracheal Airway:  ETT  Cuffed: Yes    Technique Used for Successful ETT Placement:  Direct laryngoscopy    Insertion Site:  Oral  Blade Type:  Thu  Laryngoscope Blade/Videolaryngoscope Blade Size:  3  ETT Size (mm):  7.0  Measured from:  Teeth  ETT to Teeth (cm):  21  Placement Verified by: auscultation and capnometry    Cormack-Lehane Classification:  Grade I - full view of glottis  Number of Attempts at Approach:  1

## 2024-09-09 NOTE — CARE PLAN
The patient is Stable - Low risk of patient condition declining or worsening    Shift Goals  Clinical Goals: Patient to remfain free from fall, pain controled  Patient Goals: complete surgery, tolerate food  Family Goals: EMILEE    Progress made toward(s) clinical / shift goals:  Patient to remain without fall for duration of shift.  Patient diet has advanced from NPO to clear liquids.    Patient is not progressing towards the following goals:postoperative pain, new incisions post surgery  Patient complaints of pain post surgery  Patient has not had BM since 9/7.      Problem: Pain - Standard  Goal: Alleviation of pain or a reduction in pain to the patient’s comfort goal  Outcome: Not Progressing  Note: Patient has post operative pain     Problem: Skin Integrity  Goal: Risk for impaired skin integrity will decrease  Outcome: Not Met     Problem: Bowel/Gastric:  Goal: Bowel function will improve  Outcome: Not Met

## 2024-09-09 NOTE — PROGRESS NOTES
Hospital Medicine Daily Progress Note    Date of Service  9/9/2024    Chief Complaint  Gena Rosas is a 64 y.o. female admitted 9/5/2024 with N/V/RUQ abdominal pain x 3 days.    Hospital Course  Gena Rosas is a 64 y.o. female past medical history of type 2 diabetes, hypothyroidism, hypertension, who presented 9/5/2024 with complaints of right upper quadrant pain, nausea and vomiting for 3 days.  Upon evaluation with ultrasound of right upper quadrant, she found to have cholelithiasis and gallbladder wall thickening consistent with cholecystitis.  There is no CBD dilation.  Blood work showed lipase greater than 3000, , .  Dr. Saini from general surgery consulted and recommended supportive care and treatment for pancreatitis for now, with delayed cholecystectomy next 2 days.  If bilirubin goes up, may need to consult GI for ERCP    Interval Problem Update  9/6: no longer with emesis, tolerating CLD. MRCP ordered to rule out choledocholithiasis.  Son at bedside.    9/7:  seen patient with GI, consulted for ductal stone finding on MRCP.  Patient aware and agreeable to ERCP in a.m. followed by lap saturnino in 1-2 days.  9/8: MRCP read by radiology as no ductal stones.  Patient to remain n.p.o. for lap cholecystectomy.  Blood sugar low 90 from being n.p.o.  I did start D5 LR at 50 an hour.  Patient has no pain currently in abdomen.  Son at bedside given update.  Lipase 28 .  9/9:  seen post op lap saturnino, doing well, in moderate amount of pain, A&O x4, son at bedside.  Dc D5 LR, on clear liquid diet. Lungs CTA b/l, incision CD&I.    I have discussed this patient's plan of care and discharge plan at IDT rounds today with Case Management, Nursing, Nursing leadership, and other members of the IDT team.    Consultants/Specialty  general surgery    Code Status  Full Code    Disposition  The patient is not medically cleared for discharge to home or a post-acute  facility.  Anticipate discharge to: home with close outpatient follow-up    I have placed the appropriate orders for post-discharge needs.    Review of Systems  Review of Systems   Constitutional:  Negative for chills, diaphoresis, fever and malaise/fatigue.   HENT:  Negative for congestion and sore throat.    Eyes:  Negative for pain and discharge.   Respiratory:  Negative for cough, hemoptysis, sputum production, shortness of breath and wheezing.    Cardiovascular:  Negative for chest pain, palpitations, claudication and leg swelling.   Gastrointestinal:  Positive for abdominal pain. Negative for constipation, diarrhea, melena, nausea and vomiting.   Genitourinary:  Negative for dysuria, frequency and urgency.   Musculoskeletal:  Negative for back pain, joint pain, myalgias and neck pain.   Skin:  Negative for itching and rash.   Neurological:  Negative for dizziness, sensory change, speech change, focal weakness, loss of consciousness, weakness and headaches.   Endo/Heme/Allergies:  Does not bruise/bleed easily.   Psychiatric/Behavioral:  Negative for depression, substance abuse and suicidal ideas.         Physical Exam  Temp:  [36 °C (96.8 °F)-36.5 °C (97.7 °F)] 36.3 °C (97.4 °F)  Pulse:  [62-89] 64  Resp:  [2-20] 18  BP: (118-189)/(55-88) 144/65  SpO2:  [93 %-100 %] 95 %    Physical Exam  Vitals and nursing note reviewed.   Constitutional:       General: She is not in acute distress.     Appearance: She is well-developed. She is obese. She is not diaphoretic.   HENT:      Head: Normocephalic and atraumatic.      Nose: Nose normal.      Mouth/Throat:      Pharynx: No oropharyngeal exudate.   Eyes:      General: No scleral icterus.        Right eye: No discharge.         Left eye: No discharge.      Conjunctiva/sclera: Conjunctivae normal.      Pupils: Pupils are equal, round, and reactive to light.   Neck:      Thyroid: No thyromegaly.      Vascular: No JVD.      Trachea: No tracheal deviation.   Cardiovascular:       Rate and Rhythm: Normal rate and regular rhythm.      Heart sounds: Normal heart sounds. No murmur heard.     No friction rub. No gallop.   Pulmonary:      Effort: Pulmonary effort is normal. No respiratory distress.      Breath sounds: Normal breath sounds. No stridor. No wheezing or rales.   Chest:      Chest wall: No tenderness.   Abdominal:      General: Bowel sounds are normal. There is no distension.      Palpations: Abdomen is soft. There is no mass.      Tenderness: There is abdominal tenderness. There is no guarding or rebound.      Comments: Laparoscopic incisions CD&I.   Musculoskeletal:         General: No tenderness. Normal range of motion.      Cervical back: Normal range of motion and neck supple.   Lymphadenopathy:      Cervical: No cervical adenopathy.   Skin:     General: Skin is warm and dry.      Capillary Refill: Capillary refill takes less than 2 seconds.      Findings: No erythema or rash.   Neurological:      General: No focal deficit present.      Mental Status: She is alert and oriented to person, place, and time.      Cranial Nerves: No cranial nerve deficit.      Motor: No abnormal muscle tone.      Coordination: Coordination normal.   Psychiatric:         Mood and Affect: Mood normal.         Behavior: Behavior normal.         Thought Content: Thought content normal.         Judgment: Judgment normal.         Fluids    Intake/Output Summary (Last 24 hours) at 9/9/2024 1352  Last data filed at 9/9/2024 1154  Gross per 24 hour   Intake 400 ml   Output --   Net 400 ml          Laboratory  Recent Labs     09/07/24  0013 09/08/24  0135   WBC 8.7 9.1   RBC 3.72* 3.72*   HEMOGLOBIN 11.4* 11.4*   HEMATOCRIT 34.6* 35.1*   MCV 93.0 94.4   MCH 30.6 30.6   MCHC 32.9 32.5   RDW 46.5 46.5   PLATELETCT 222 223   MPV 10.3 10.5     Recent Labs     09/07/24  0013 09/08/24  0135   SODIUM 142 140   POTASSIUM 3.9 4.0   CHLORIDE 107 109   CO2 22 21   GLUCOSE 99 113*   BUN 7* 6*   CREATININE 0.57 0.53    CALCIUM 8.9 8.7                   Imaging  [id number]   Final Result      1.  Cholelithiasis.   2.  No choledocholithiasis or significant biliary dilatation.      US-RUQ   Final Result         1.  Cholelithiasis with gallbladder wall thickening, sonographically compatible with cholecystitis.   2.  Hepatomegaly   3.  Echogenic liver, compatible with fatty change versus fibrosis.   4.  Mild dilatation right renal pelvis compatible with extrarenal pelvis morphology or mild hydronephrosis.           Assessment/Plan  * Calculus of gallbladder with acute cholecystitis without obstruction- (present on admission)  Assessment & Plan  64-year-old female presented with right upper quadrant pain nausea vomiting for 3 days  Right upper quadrant ultrasound: Cholelithiasis, gallbladder wall thickening  Plan for definitive treatment as per surgery after pancreatitis cools down  No antibiotics for now since WBC is not elevated  Symptomatic treatment with Zofran, morphine as needed  IV rehydration, Pepcid  9/7: MRCP with multiple stones seen in gallbladder.   No ductal stones per rad read.  9/8:  npo for lap saturnino.  9/9:  lap saturnino with Dr. Acuna, no complications.    Hypertension- (present on admission)  Assessment & Plan  Will hold lisinopril due to dehydration.  Monitor blood pressure, restart if necessary    Dyslipidemia- (present on admission)  Assessment & Plan  Will hold Lipitor due to elevated LFTs    Hypokalemia- (present on admission)  Assessment & Plan  Potassium 3.4.  IV saline with potassium 20    Type 2 diabetes mellitus (HCC)- (present on admission)  Assessment & Plan  Controlled with hyperglycemia  Will hold metformin and order insulin sliding scale    Gallstone pancreatitis- (present on admission)  Assessment & Plan  Continue IV hydration  Zofran as needed  Clear liquid diet as tolerated  MRCP no ductal stone seen.  9/9 lap saturnino, no complications.         VTE prophylaxis:    enoxaparin ppx      I have  performed a physical exam and reviewed and updated ROS and Plan today (9/9/2024). In review of yesterday's note (9/8/2024), there are no changes except as documented above.

## 2024-09-09 NOTE — CARE PLAN
The patient is Stable - Low risk of patient condition declining or worsening    Shift Goals  Clinical Goals: Pt will remain safe from injury this shift. Pain controled and NPO for procedure in AM>  Patient Goals: Get surgery completed and return home.  Family Goals: EMILEE    Progress made toward(s) clinical / shift goals:    Problem: Pain - Standard  Goal: Alleviation of pain or a reduction in pain to the patient’s comfort goal  Outcome: Progressing       Patient is not progressing towards the following goals:    No acute changes this shift. No complaints of pain overnight. Pt continues to refuse tylenol d/t 0/10 pain reported. IVF maintained as shown in MAR. FSBS completed q6h. No insulin required. Pt NPO after MN this shift for planned sx procedure in AM.

## 2024-09-10 ENCOUNTER — PHARMACY VISIT (OUTPATIENT)
Dept: PHARMACY | Facility: MEDICAL CENTER | Age: 64
End: 2024-09-10
Payer: COMMERCIAL

## 2024-09-10 VITALS
TEMPERATURE: 97.8 F | HEIGHT: 65 IN | WEIGHT: 217.15 LBS | HEART RATE: 54 BPM | RESPIRATION RATE: 17 BRPM | DIASTOLIC BLOOD PRESSURE: 69 MMHG | OXYGEN SATURATION: 92 % | BODY MASS INDEX: 36.18 KG/M2 | SYSTOLIC BLOOD PRESSURE: 128 MMHG

## 2024-09-10 LAB
ALBUMIN SERPL BCP-MCNC: 3.3 G/DL (ref 3.2–4.9)
ALBUMIN/GLOB SERPL: 1.1 G/DL
ALP SERPL-CCNC: 89 U/L (ref 30–99)
ALT SERPL-CCNC: 80 U/L (ref 2–50)
ANION GAP SERPL CALC-SCNC: 10 MMOL/L (ref 7–16)
AST SERPL-CCNC: 32 U/L (ref 12–45)
BASOPHILS # BLD AUTO: 0.2 % (ref 0–1.8)
BASOPHILS # BLD: 0.02 K/UL (ref 0–0.12)
BILIRUB SERPL-MCNC: 0.4 MG/DL (ref 0.1–1.5)
BUN SERPL-MCNC: 9 MG/DL (ref 8–22)
CALCIUM ALBUM COR SERPL-MCNC: 8.9 MG/DL (ref 8.5–10.5)
CALCIUM SERPL-MCNC: 8.3 MG/DL (ref 8.5–10.5)
CHLORIDE SERPL-SCNC: 103 MMOL/L (ref 96–112)
CO2 SERPL-SCNC: 24 MMOL/L (ref 20–33)
CREAT SERPL-MCNC: 0.61 MG/DL (ref 0.5–1.4)
EOSINOPHIL # BLD AUTO: 0.02 K/UL (ref 0–0.51)
EOSINOPHIL NFR BLD: 0.2 % (ref 0–6.9)
ERYTHROCYTE [DISTWIDTH] IN BLOOD BY AUTOMATED COUNT: 44.4 FL (ref 35.9–50)
GFR SERPLBLD CREATININE-BSD FMLA CKD-EPI: 101 ML/MIN/1.73 M 2
GLOBULIN SER CALC-MCNC: 2.9 G/DL (ref 1.9–3.5)
GLUCOSE BLD STRIP.AUTO-MCNC: 120 MG/DL (ref 65–99)
GLUCOSE SERPL-MCNC: 115 MG/DL (ref 65–99)
HCT VFR BLD AUTO: 33.6 % (ref 37–47)
HGB BLD-MCNC: 11.3 G/DL (ref 12–16)
IMM GRANULOCYTES # BLD AUTO: 0.04 K/UL (ref 0–0.11)
IMM GRANULOCYTES NFR BLD AUTO: 0.4 % (ref 0–0.9)
LIPASE SERPL-CCNC: 16 U/L (ref 11–82)
LYMPHOCYTES # BLD AUTO: 2.44 K/UL (ref 1–4.8)
LYMPHOCYTES NFR BLD: 24.5 % (ref 22–41)
MCH RBC QN AUTO: 30.6 PG (ref 27–33)
MCHC RBC AUTO-ENTMCNC: 33.6 G/DL (ref 32.2–35.5)
MCV RBC AUTO: 91.1 FL (ref 81.4–97.8)
MONOCYTES # BLD AUTO: 0.85 K/UL (ref 0–0.85)
MONOCYTES NFR BLD AUTO: 8.5 % (ref 0–13.4)
NEUTROPHILS # BLD AUTO: 6.59 K/UL (ref 1.82–7.42)
NEUTROPHILS NFR BLD: 66.2 % (ref 44–72)
NRBC # BLD AUTO: 0 K/UL
NRBC BLD-RTO: 0 /100 WBC (ref 0–0.2)
PLATELET # BLD AUTO: 223 K/UL (ref 164–446)
PMV BLD AUTO: 10.8 FL (ref 9–12.9)
POTASSIUM SERPL-SCNC: 4.1 MMOL/L (ref 3.6–5.5)
PROT SERPL-MCNC: 6.2 G/DL (ref 6–8.2)
RBC # BLD AUTO: 3.69 M/UL (ref 4.2–5.4)
SODIUM SERPL-SCNC: 137 MMOL/L (ref 135–145)
WBC # BLD AUTO: 10 K/UL (ref 4.8–10.8)

## 2024-09-10 PROCEDURE — 700102 HCHG RX REV CODE 250 W/ 637 OVERRIDE(OP): Performed by: INTERNAL MEDICINE

## 2024-09-10 PROCEDURE — 700102 HCHG RX REV CODE 250 W/ 637 OVERRIDE(OP): Performed by: SURGERY

## 2024-09-10 PROCEDURE — 85025 COMPLETE CBC W/AUTO DIFF WBC: CPT

## 2024-09-10 PROCEDURE — A9270 NON-COVERED ITEM OR SERVICE: HCPCS | Performed by: SURGERY

## 2024-09-10 PROCEDURE — RXMED WILLOW AMBULATORY MEDICATION CHARGE: Performed by: STUDENT IN AN ORGANIZED HEALTH CARE EDUCATION/TRAINING PROGRAM

## 2024-09-10 PROCEDURE — 82962 GLUCOSE BLOOD TEST: CPT

## 2024-09-10 PROCEDURE — 83690 ASSAY OF LIPASE: CPT

## 2024-09-10 PROCEDURE — 99024 POSTOP FOLLOW-UP VISIT: CPT | Performed by: SURGERY

## 2024-09-10 PROCEDURE — 80053 COMPREHEN METABOLIC PANEL: CPT

## 2024-09-10 PROCEDURE — A9270 NON-COVERED ITEM OR SERVICE: HCPCS | Performed by: INTERNAL MEDICINE

## 2024-09-10 PROCEDURE — 99239 HOSP IP/OBS DSCHRG MGMT >30: CPT | Performed by: STUDENT IN AN ORGANIZED HEALTH CARE EDUCATION/TRAINING PROGRAM

## 2024-09-10 PROCEDURE — 700111 HCHG RX REV CODE 636 W/ 250 OVERRIDE (IP): Mod: JZ | Performed by: SURGERY

## 2024-09-10 RX ORDER — CELECOXIB 200 MG/1
200 CAPSULE ORAL 2 TIMES DAILY
Qty: 10 CAPSULE | Refills: 0 | Status: SHIPPED | OUTPATIENT
Start: 2024-09-10 | End: 2024-09-15

## 2024-09-10 RX ORDER — OXYCODONE HYDROCHLORIDE 5 MG/1
5 TABLET ORAL EVERY 6 HOURS PRN
Qty: 12 TABLET | Refills: 0 | Status: SHIPPED | OUTPATIENT
Start: 2024-09-10 | End: 2024-09-13

## 2024-09-10 RX ADMIN — LEVOTHYROXINE SODIUM 100 MCG: 0.1 TABLET ORAL at 04:50

## 2024-09-10 RX ADMIN — ACETAMINOPHEN 1000 MG: 500 TABLET ORAL at 11:44

## 2024-09-10 RX ADMIN — ACETAMINOPHEN 1000 MG: 500 TABLET ORAL at 04:49

## 2024-09-10 RX ADMIN — CELECOXIB 200 MG: 100 CAPSULE ORAL at 09:08

## 2024-09-10 RX ADMIN — KETOROLAC TROMETHAMINE 15 MG: 15 INJECTION, SOLUTION INTRAMUSCULAR; INTRAVENOUS at 11:44

## 2024-09-10 RX ADMIN — ACETAMINOPHEN 1000 MG: 500 TABLET ORAL at 00:04

## 2024-09-10 RX ADMIN — KETOROLAC TROMETHAMINE 15 MG: 15 INJECTION, SOLUTION INTRAMUSCULAR; INTRAVENOUS at 00:03

## 2024-09-10 RX ADMIN — KETOROLAC TROMETHAMINE 15 MG: 15 INJECTION, SOLUTION INTRAMUSCULAR; INTRAVENOUS at 04:50

## 2024-09-10 ASSESSMENT — PAIN DESCRIPTION - PAIN TYPE
TYPE: ACUTE PAIN

## 2024-09-10 NOTE — DISCHARGE INSTRUCTIONS
Post Operative Discharge Instructions:    1. DIET: Upon discharge from the hospital, you may resume your normal preoperative diet, unless specifically directed otherwise. Depending on how you are feeling and whether you have nausea or not, you may wish to stay with a bland diet for the first few days. However, you can advance this as quickly as you feel ready.    2. ACTIVITIES: After discharge from the hospital, you may resume full routine activities; however, there should be no heavy lifting (greater than 20 pounds or a bag of groceries) and no strenuous activities for at least 2 weeks. The duration may be longer, depending on your surgical procedure. Routine activities of daily living are acceptable. Activity level should be addressed at your post-op follow up appointment.    3. DRIVING: You may drive whenever you are off pain medications and are able to perform the activities needed to drive, i.e., turning, bending, twisting, etc.    4. BATHING: You may get the wound wet at any time after leaving the hospital. You may shower, but do not submerge in a bath for at least two weeks.  If you have wound dressings, they may come off after 48 hours.  If you have skin glue to the wound, this will fall off on its own, do not pick at it.  If you have Steri-Strips to the wound, these will fall off on their own, do not pick at them. You may trim the edges if needed.    5. BOWEL FUNCTION:   After surgery, it is not uncommon for patients to develop either frequent or loose stools after meals. This usually corrects itself after a few days, to a few weeks. If this occurs, do not worry; this will resolve on its own.  Constipation is much more common than loose stools. The cause is the combination of pain medication and decreased activity level and possibly the nature of the surgical procedure performed. If you feel this is occurring, you may use an over-the-counter treatment such as MiraLAX (or Milk of Magnesia, Ex-Lax,  Senokot, etc.) until the problem has resolved. Drink plenty of water and try to wean off narcotic pain medications as soon as is comfortable for you.    6. PAIN MEDICATION:   You will be given a prescription for pain medication at discharge. Please take these as directed. It is important to remember not to take medications on an empty stomach as this may cause nausea.  You may also take over the counter acetaminophen and/or NSAIDS (ibuprofen, Aleve, Advil, Motrin) per the package instructions.  You may also use ice to the wound to decrease pain and swelling. You may alternate 20 minutes on and 20 minutes off with the ice for the first 24-48 hours. Make sure you place a washcloth or towel between the ice pack and your skin.  Please note that narcotic pain medication cannot be refilled unless you are seen by a doctor. Make sure you call the office if you are running low on medication or if the dose you have been prescribed is not working well for you.    7.CALL THE [facility] AT [phone] IF YOU HAVE:  (1) Fevers to more than 101F, (2) Unusual chest or leg pain, (3) Drainage or fluid from incision that may be foul smelling, increased tenderness or soreness at the wound or the wound edges are no longer together, redness or swelling at the incision site. Do not hesitate to call with any other questions.

## 2024-09-10 NOTE — PROGRESS NOTES
"  DATE: 9/10/2024    Hospital Day 6   gall stone pancreatitis .  POD #1 lap saturnino    INTERVAL EVENTS:  Feels better Doing well. Tolerating diet. Will sign off. FU in 1 week.    REVIEW OF SYSTEMS:  Comprehensive review of systems is negative with the exception of the aforementioned HPI, PMH, and PSH bullets in accordance with CMS guidelines.    PHYSICAL EXAMINATION:  Vital Signs: /69   Pulse (!) 54   Temp 36.6 °C (97.8 °F) (Temporal)   Resp 17   Ht 1.651 m (5' 5\")   Wt 98.5 kg (217 lb 2.5 oz)   SpO2 92%   Physical Exam  Cardiovascular:      Rate and Rhythm: Normal rate.   Pulmonary:      Effort: Pulmonary effort is normal.   Abdominal:      General: There is no distension.      Palpations: Abdomen is soft.      Tenderness: There is no abdominal tenderness.      Comments: Incisions healing well   Musculoskeletal:         General: Normal range of motion.   Skin:     General: Skin is warm.   Neurological:      General: No focal deficit present.      Mental Status: She is alert.         LABORATORY VALUES:  Recent Labs     09/08/24  0135 09/10/24  0744   WBC 9.1 10.0   RBC 3.72* 3.69*   HEMOGLOBIN 11.4* 11.3*   HEMATOCRIT 35.1* 33.6*   MCV 94.4 91.1   MCH 30.6 30.6   MCHC 32.5 33.6   RDW 46.5 44.4   PLATELETCT 223 223   MPV 10.5 10.8     Recent Labs     09/08/24  0135 09/10/24  0744   SODIUM 140 137   POTASSIUM 4.0 4.1   CHLORIDE 109 103   CO2 21 24   GLUCOSE 113* 115*   BUN 6* 9   CREATININE 0.53 0.61   CALCIUM 8.7 8.3*     Recent Labs     09/08/24  0135 09/10/24  0744   ASTSGOT 45 32   ALTSGPT 155* 80*   TBILIRUBIN 0.4 0.4   ALKPHOSPHAT 106* 89   GLOBULIN 3.0 2.9           IMAGING:  [id number]   Final Result      1.  Cholelithiasis.   2.  No choledocholithiasis or significant biliary dilatation.      US-RUQ   Final Result         1.  Cholelithiasis with gallbladder wall thickening, sonographically compatible with cholecystitis.   2.  Hepatomegaly   3.  Echogenic liver, compatible with fatty change versus " fibrosis.   4.  Mild dilatation right renal pelvis compatible with extrarenal pelvis morphology or mild hydronephrosis.          ASSESSMENT AND PLAN:  Gallstone pancreatitis- (present on admission)  Assessment & Plan  US with cholelithiasis with gallbladder wall thickening, sonographically compatible with cholecystitis and hepatomegaly  9/7 MRCP complete  Lap saturnino when acute gallstone pancreatitis resolved  9/9 plan lap saturnino  ACS Blue              ____________________________________     Santa Acuna M.D.    DD: 9/9/2024  9:07 AM

## 2024-09-10 NOTE — PROGRESS NOTES
Hourly rounds performed. Patient is resting in bed. Respirations even and unlabored. Reports pain 7/10 ps, scheduled pain medications given. Fall precautions continued and call light within reach. All other needs met at this time.

## 2024-09-10 NOTE — DISCHARGE SUMMARY
Discharge Summary    CHIEF COMPLAINT ON ADMISSION  Chief Complaint   Patient presents with    Abdominal Pain    Back Pain     Pt reports upper abdominal pain that radiates to her back that started approx 3 days prior. Pt reports n/v over last 3 days. Denies diarrhea.     N/V       Reason for Admission  abdominal pain     Admission Date  9/5/2024    CODE STATUS  Full Code    HPI & HOSPITAL COURSE    Gena Rosas is a 64 y.o. female past medical history of type 2 diabetes, hypothyroidism, hypertension, who presented 9/5/2024 with complaints of right upper quadrant pain, nausea and vomiting for 3 days.  Upon evaluation with ultrasound of right upper quadrant, she found to have cholelithiasis and gallbladder wall thickening consistent with cholecystitis.  There was no CBD dilation.  Blood work showed lipase greater than 3000, , .  Dr. Saini from general surgery consulted and recommended supportive care and treatment for pancreatitis for now, with delayed cholecystectomy next 2 days. With close monitoring of her liver enzymes. She did undergo MRCP which showed no ductal stone and she underwent laparoscopic cholecystectomy on 9/9. She did well post operatively, her diet was advanced and her pain was controlled and she was cleared by surgery for discharge. Thus she was discharged home with outpatient follow up.       Therefore, she is discharged in fair and stable condition to home with close outpatient follow-up.    The patient met 2-midnight criteria for an inpatient stay at the time of discharge.    Discharge Date  9/10/2024    FOLLOW UP ITEMS POST DISCHARGE  Post op follow up   Chronic medical conditions     DISCHARGE DIAGNOSES  Principal Problem:    Calculus of gallbladder with acute cholecystitis without obstruction (POA: Yes)  Active Problems:    Gallstone pancreatitis (POA: Yes)    Type 2 diabetes mellitus (HCC) (POA: Yes)    Hypokalemia (POA: Yes)    Dyslipidemia (POA: Yes)     Hypertension (POA: Yes)  Resolved Problems:    * No resolved hospital problems. *      FOLLOW UP  No future appointments.  [facility]  [address]  [place] [id number] [phone]  Schedule an appointment as soon as possible for a visit        MEDICATIONS ON DISCHARGE     Medication List        START taking these medications        Instructions   celecoxib 200 MG Caps  Commonly known as: CeleBREX   Take 1 Capsule by mouth 2 times a day for 5 days.  Dose: 200 mg     oxyCODONE immediate-release 5 MG Tabs  Commonly known as: Roxicodone   Take 1 Tablet by mouth every 6 hours as needed for Severe Pain for up to 3 days.  Dose: 5 mg            CONTINUE taking these medications        Instructions   acetaminophen 500 MG Tabs  Commonly known as: Tylenol   Take 500-1,000 mg by mouth every 6 hours as needed.  Dose: 500-1,000 mg     levothyroxine 100 MCG Tabs  Commonly known as: Synthroid   Take 100 mcg by mouth Every morning on an empty stomach.  Dose: 100 mcg     lisinopril 40 MG tablet  Commonly known as: Prinivil   Take 40 mg by mouth every day.  Dose: 40 mg     metformin 1000 MG tablet  Commonly known as: Glucophage   Take 1,000 mg by mouth 2 times a day, with meals.  Dose: 1,000 mg     pantoprazole 40 MG Tbec  Commonly known as: Protonix   Take 40 mg by mouth every day.  Dose: 40 mg              Allergies  No Active Allergies    DIET  Orders Placed This Encounter   Procedures    Diet Order Diet: Low Fiber(GI Soft)     Standing Status:   Standing     Number of Occurrences:   1     Order Specific Question:   Diet:     Answer:   Low Fiber(GI Soft) [2]       ACTIVITY  As tolerated.      CONSULTATIONS  General surgery - [facility]     PROCEDURES  Laparoscopic cholecystectomy.  9/9/2024    LABORATORY  Lab Results   Component Value Date    SODIUM 137 09/10/2024    POTASSIUM 4.1 09/10/2024    CHLORIDE 103 09/10/2024    CO2 24 09/10/2024    GLUCOSE 115 (H) 09/10/2024    BUN 9 09/10/2024    CREATININE 0.61  09/10/2024    CREATININE 0.7 05/12/2008        Lab Results   Component Value Date    WBC 10.0 09/10/2024    HEMOGLOBIN 11.3 (L) 09/10/2024    HEMATOCRIT 33.6 (L) 09/10/2024    PLATELETCT 223 09/10/2024        Total time of the discharge process exceeds 32 minutes.

## 2024-09-10 NOTE — PROGRESS NOTES
Pt. Picked up by transport to go to [facility], returned pt.4 bottles of home meds that were stored at pharmacy. All belongings sent with pt. Family accompanied pt. Aware of to make ff up in 1 week in surgeons office.

## 2024-09-10 NOTE — PROGRESS NOTES
Received bedside report from previous RN, Pt is alert and oriented x4. O2 at 2L via NC with o2 sat of 94% at this time. Fall precautions in place and call light within reach. All other needs met at this time.

## 2024-09-10 NOTE — CARE PLAN
The patient is Watcher - Medium risk of patient condition declining or worsening    Shift Goals  Clinical Goals: patient will report a reduced pain from 7/10 to less than 4 at the end of the shift  Patient Goals: rest  Family Goals: gregorio    Progress made toward(s) clinical / shift goals:  updated on POC and verbalized understanding. Medicated per MAR and tolerated well. OOB to the bathroom. Dressings CDI. Needs attended.    Patient is not progressing towards the following goals:    Problem: Pain - Standard  Goal: Alleviation of pain or a reduction in pain to the patient’s comfort goal  Description: Target End Date:  Prior to discharge or change in level of care    Document on Vitals flowsheet    1.  Document pain using the appropriate pain scale per order or unit policy  2.  Educate and implement non-pharmacologic comfort measures (i.e. relaxation, distraction, massage, cold/heat therapy, etc.)  3.  Pain management medications as ordered  4.  Reassess pain after pain med administration per policy  5.  If opiods administered assess patient's response to pain medication is appropriate per POSS sedation scale  6.  Follow pain management plan developed in collaboration with patient and interdisciplinary team (including palliative care or pain specialists if applicable)  Outcome: Not Progressing     Problem: Knowledge Deficit - Standard  Goal: Patient and family/care givers will demonstrate understanding of plan of care, disease process/condition, diagnostic tests and medications  Description: Target End Date:  1-3 days or as soon as patient condition allows    Document in Patient Education    1.  Patient and family/caregiver oriented to unit, equipment, visitation policy and means for communicating concern  2.  Complete/review Learning Assessment  3.  Assess knowledge level of disease process/condition, treatment plan, diagnostic tests and medications  4.  Explain disease process/condition, treatment plan, diagnostic  tests and medications  Outcome: Not Progressing

## 2024-09-16 NOTE — PROGRESS NOTES
"      HISTORY OF PRESENT ILLNESS: The patient is a 64 year-old woman who returns to the [facility] for routine follow-up after undergoing a laparoscopic cholecystectomy for gallstone pancreatitis on 9/9 by Santa Acuna MD. She discharged from the hospital on 9/10.    CURRENT MEDICATIONS:  Current Outpatient Medications   Medication Sig    pantoprazole (PROTONIX) 40 MG Tablet Delayed Response Take 40 mg by mouth every day.    acetaminophen (TYLENOL) 500 MG Tab Take 500-1,000 mg by mouth every 6 hours as needed.    lisinopril (PRINIVIL) 40 MG tablet Take 40 mg by mouth every day.    metformin (GLUCOPHAGE) 1000 MG tablet Take 1,000 mg by mouth 2 times a day, with meals.    levothyroxine (SYNTHROID) 100 MCG TABS Take 100 mcg by mouth Every morning on an empty stomach.       PHYSICAL EXAMINATION:  Vital Signs: There were no vitals taken for this visit.  No distress  Some bruising along the right abdomen, incisions are otherwise clean and intact    LABORATORY VALUES:  Lab Results   Component Value Date/Time    WBC 10.0 09/10/2024 07:44 AM    RBC 3.69 (L) 09/10/2024 07:44 AM    HEMOGLOBIN 11.3 (L) 09/10/2024 07:44 AM    HEMATOCRIT 33.6 (L) 09/10/2024 07:44 AM    MCV 91.1 09/10/2024 07:44 AM    MCH 30.6 09/10/2024 07:44 AM    MCHC 33.6 09/10/2024 07:44 AM    MPV 10.8 09/10/2024 07:44 AM    NEUTSPOLYS 66.20 09/10/2024 07:44 AM    LYMPHOCYTES 24.50 09/10/2024 07:44 AM    MONOCYTES 8.50 09/10/2024 07:44 AM    EOSINOPHILS 0.20 09/10/2024 07:44 AM    BASOPHILS 0.20 09/10/2024 07:44 AM     Lab Results   Component Value Date/Time    SODIUM 137 09/10/2024 07:44 AM    POTASSIUM 4.1 09/10/2024 07:44 AM    CHLORIDE 103 09/10/2024 07:44 AM    CO2 24 09/10/2024 07:44 AM    GLUCOSE 115 (H) 09/10/2024 07:44 AM    BUN 9 09/10/2024 07:44 AM    CREATININE 0.61 09/10/2024 07:44 AM    CREATININE 0.7 05/12/2008 02:35 PM     No results found for: \"PROTHROMBTM\", \"INR\"    IMAGING:  No orders to display       PATHOLOGY: " Final pathology demonstrated chronic cholecystitis with cholelithiasis    ASSESSMENT AND PLAN:  1. S/P cholecystectomy  Routine post-operative course  Path reviewed with patient    Discharge from the [facility].       ____________________________________     CHAYO Bernstein    DD: 9/16/2024  10:02 AM

## 2024-09-17 ENCOUNTER — OFFICE VISIT (OUTPATIENT)
Dept: SURGERY | Facility: MEDICAL CENTER | Age: 64
End: 2024-09-17
Attending: STUDENT IN AN ORGANIZED HEALTH CARE EDUCATION/TRAINING PROGRAM
Payer: COMMERCIAL

## 2024-09-17 VITALS
HEART RATE: 80 BPM | WEIGHT: 212 LBS | DIASTOLIC BLOOD PRESSURE: 60 MMHG | HEIGHT: 61 IN | SYSTOLIC BLOOD PRESSURE: 120 MMHG | BODY MASS INDEX: 40.02 KG/M2 | RESPIRATION RATE: 16 BRPM

## 2024-09-17 DIAGNOSIS — Z90.49 S/P CHOLECYSTECTOMY: ICD-10-CM

## 2024-09-17 PROCEDURE — 99024 POSTOP FOLLOW-UP VISIT: CPT | Performed by: STUDENT IN AN ORGANIZED HEALTH CARE EDUCATION/TRAINING PROGRAM

## 2024-09-17 ASSESSMENT — FIBROSIS 4 INDEX: FIB4 SCORE: 0.99

## 2025-04-24 ENCOUNTER — HOSPITAL ENCOUNTER (OUTPATIENT)
Dept: RADIOLOGY | Facility: MEDICAL CENTER | Age: 65
End: 2025-04-24
Payer: COMMERCIAL

## 2025-04-30 ENCOUNTER — HOSPITAL ENCOUNTER (OUTPATIENT)
Dept: RADIOLOGY | Facility: MEDICAL CENTER | Age: 65
End: 2025-04-30
Attending: STUDENT IN AN ORGANIZED HEALTH CARE EDUCATION/TRAINING PROGRAM
Payer: COMMERCIAL

## 2025-04-30 DIAGNOSIS — Z12.31 VISIT FOR SCREENING MAMMOGRAM: ICD-10-CM

## 2025-04-30 PROCEDURE — 77067 SCR MAMMO BI INCL CAD: CPT

## (undated) DEVICE — TROCAR5X55 KII SHIELDED SYS - (6/BX)

## (undated) DEVICE — LACTATED RINGERS INJ 1000 ML - (14EA/CA 60CA/PF)

## (undated) DEVICE — TROCAR 5X100 BLADED Z-THREAD - KII (6/BX)

## (undated) DEVICE — SUTURE GENERAL

## (undated) DEVICE — GLOVE SZ 6.5 BIOGEL PI MICRO - PF LF (50PR/BX)

## (undated) DEVICE — GLOVE BIOGEL PI INDICATOR SZ 6.5 SURGICAL PF LF - (50/BX 4BX/CA)

## (undated) DEVICE — SET LEADWIRE 5 LEAD BEDSIDE DISPOSABLE ECG (1SET OF 5/EA)

## (undated) DEVICE — TROCARCANN&SEAL 5X55 ZTHREAD - 12/BX

## (undated) DEVICE — GLOVE BIOGEL SZ 6.5 SURGICAL PF LTX (50PR/BX 4BX/CA)

## (undated) DEVICE — SLEEVE, VASO, THIGH, MED

## (undated) DEVICE — CHLORAPREP 26 ML APPLICATOR - ORANGE TINT(25/CA)

## (undated) DEVICE — SODIUM CHL IRRIGATION 0.9% 1000ML (12EA/CA)

## (undated) DEVICE — COVER LIGHT HANDLE ALC PLUS DISP (18EA/BX)

## (undated) DEVICE — SUTURE 4-0 VICRYL PLUS FS-2 - 27 INCH (36/BX)

## (undated) DEVICE — SET EXTENSION WITH 2 PORTS (48EA/CA) ***PART #2C8610 IS A SUBSTITUTE*****

## (undated) DEVICE — SUTURE 0 VICRYL PLUS UR-6 - 27 INCH (36/BX)

## (undated) DEVICE — SPONGE GAUZESTER. 2X2 4-PL - (2/PK 50PK/BX 30BX/CS)

## (undated) DEVICE — GLOVE BIOGEL PI INDICATOR SZ 7.0 SURGICAL PF LF - (50/BX 4BX/CA)

## (undated) DEVICE — SUCTION INSTRUMENT YANKAUER BULBOUS TIP W/O VENT (50EA/CA)

## (undated) DEVICE — GOWN SURGEONS X-LARGE - DISP. (30/CA)

## (undated) DEVICE — NEEDLE INSFL 120MM 14GA VRRS - (20/BX)

## (undated) DEVICE — SENSOR OXIMETER ADULT SPO2 RD SET (20EA/BX)

## (undated) DEVICE — TROCAR Z THREAD 11 X 100 - BLADED (6/BX)

## (undated) DEVICE — TUBING CLEARLINK DUO-VENT - C-FLO (48EA/CA)

## (undated) DEVICE — CANNULA W/SEAL 5X100 Z-THRE - ADED KII (12/BX)

## (undated) DEVICE — GLOVE BIOGEL INDICATOR SZ 6.5 SURGICAL PF LTX - (50PR/BX 4BX/CA)

## (undated) DEVICE — SET SUCTION/IRRIGATION WITH DISPOSABLE TIP (6/CA )PART #0250-070-520 IS A SUB

## (undated) DEVICE — ELECTRODE DUAL RETURN W/ CORD - (50/PK)

## (undated) DEVICE — CLOSURE WOUND 1/4 X 4 (STERI - STRIP) (50/BX 4BX/CA)

## (undated) DEVICE — PACK LAP CHOLE OR - (2EA/CA)

## (undated) DEVICE — CANISTER SUCTION 3000ML MECHANICAL FILTER AUTO SHUTOFF MEDI-VAC NONSTERILE LF DISP (40EA/CA)

## (undated) DEVICE — GOWN WARMING STANDARD FLEX - (30/CA)

## (undated) DEVICE — DRESSING TRANSPARENT FILM TEGADERM 2.375 X 2.75" (100EA/BX)"